# Patient Record
Sex: MALE | Race: WHITE | Employment: STUDENT | URBAN - METROPOLITAN AREA
[De-identification: names, ages, dates, MRNs, and addresses within clinical notes are randomized per-mention and may not be internally consistent; named-entity substitution may affect disease eponyms.]

---

## 2021-09-15 PROBLEM — S62.021A CLOSED DISPLACED FRACTURE OF MIDDLE THIRD OF NAVICULAR BONE OF RIGHT WRIST: Status: ACTIVE | Noted: 2021-09-15

## 2021-09-15 NOTE — H&P (VIEW-ONLY)
[facility] Note    Name: Prince Diaz  Age: 24 y.o. YOB: 2000  Gender: male  MRN: [id number]      Follow up visit:   1. Closed displaced fracture of middle third of scaphoid bone of right wrist, initial encounter        HPI: Prince Diaz is a 24 y.o. male who is following up for right wrist pain, on the last visit we obtained an MRI to rule out a scaphoid fracture, he is here to review the results. ROS/Meds/PSH/PMH/FH/SH: I personally reviewed the patients standard intake form. Pertinents are discussed in the HPI    Physical Examination:  General: Awake and alert. HEENT: Normocephalic, atraumatic  CV/Pulm: Breathing even and unlabored  Skin: No obvious rashes noted. Lymphatic: No obvious evidence of lymphedema or lymphadenopathy    Musculoskeletal Examination:  Examination on the Right upper extremity demonstrates cap refill < 5 seconds in all fingers, tenderness palpation of the anatomic snuffbox as well as the scaphoid tubercle and radiocarpal joint, limited wrist motion due to pain, full finger motion. Imaging / Electrodiagnostic Tests:     MRI was reviewed and independently interpreted which demonstrates a mid waist to slightly more proximal minimally displaced fracture of the scaphoid waist with significant bony contusion of the scaphoid, lunate and triquetrum    Assessment:   1. Closed displaced fracture of middle third of scaphoid bone of right wrist, initial encounter        Plan:   We discussed the diagnosis and different treatment options. We discussed observation, therapy, antiinflammatory medications and other pertinent treatment modalities. After discussing in detail the patient elects to proceed with right scaphoid open reduction internal fixation.   We discussed all treatment options including casting for 2 months with a very good chance of healing of upwards of 95%, this comes at the expense of some loss of wrist motion due to being in a cast for 2 months and if the fracture does not heal then the outcome is much less favorable. We discussed the treatment with open reduction internal fixation, if the fracture remains undisplaced during surgery then I would only put him in a splint for 2 weeks and then transition him to a wrist brace so he can start motion at 2 weeks, if the fracture displaces at all then I would have a very low threshold to place him in a cast for 6 weeks and then start motion    Patient voiced accordance and understanding of the information provided and the formulated plan. All questions were answered to the patient's satisfaction during the encounter. 5 This is an illness/condition that threatens bodily function  Treatment at this time: Elective major surgery with procedural risk factors     Patient understands risks and benefits of RIGHT SCAPHOID OPEN REDUCTION INTERNAL FIXATION including but not limited to nerve injury, vessel injury, infection, failure to achieve desired results and possible need for additional surgery. Patient understands and wishes to proceed with surgery.      On Exam:   The patient is alert and oriented; ;   Lung auscultation is clear bilaterally   Heart has RRR without murmurs      Tanesha Barber MD  Orthopaedic Surgery  09/15/21  4:19 PM

## 2021-09-19 ENCOUNTER — ANESTHESIA EVENT (OUTPATIENT)
Dept: SURGERY | Age: 21
End: 2021-09-19
Payer: COMMERCIAL

## 2021-09-20 ENCOUNTER — HOSPITAL ENCOUNTER (OUTPATIENT)
Age: 21
Setting detail: OUTPATIENT SURGERY
Discharge: HOME | End: 2021-09-20
Attending: ORTHOPAEDIC SURGERY | Admitting: ORTHOPAEDIC SURGERY
Payer: COMMERCIAL

## 2021-09-20 ENCOUNTER — ANESTHESIA (OUTPATIENT)
Dept: SURGERY | Age: 21
End: 2021-09-20
Payer: COMMERCIAL

## 2021-09-20 ENCOUNTER — APPOINTMENT (OUTPATIENT)
Dept: GENERAL RADIOLOGY | Age: 21
End: 2021-09-20
Attending: ORTHOPAEDIC SURGERY
Payer: COMMERCIAL

## 2021-09-20 VITALS
BODY MASS INDEX: 24.55 KG/M2 | DIASTOLIC BLOOD PRESSURE: 80 MMHG | HEART RATE: 57 BPM | WEIGHT: 156.4 LBS | HEIGHT: 67 IN | RESPIRATION RATE: 16 BRPM | SYSTOLIC BLOOD PRESSURE: 116 MMHG | OXYGEN SATURATION: 99 % | TEMPERATURE: 98 F

## 2021-09-20 PROCEDURE — 77030027129 HC BIT DRL SYNT -E: Performed by: ORTHOPAEDIC SURGERY

## 2021-09-20 PROCEDURE — 77030000032 HC CUF TRNQT ZIMM -B: Performed by: ORTHOPAEDIC SURGERY

## 2021-09-20 PROCEDURE — 76010000160 HC OR TIME 0.5 TO 1 HR INTENSV-TIER 1: Performed by: ORTHOPAEDIC SURGERY

## 2021-09-20 PROCEDURE — 76210000006 HC OR PH I REC 0.5 TO 1 HR: Performed by: ORTHOPAEDIC SURGERY

## 2021-09-20 PROCEDURE — 2709999900 HC NON-CHARGEABLE SUPPLY: Performed by: ORTHOPAEDIC SURGERY

## 2021-09-20 PROCEDURE — 74011250636 HC RX REV CODE- 250/636: Performed by: NURSE PRACTITIONER

## 2021-09-20 PROCEDURE — 74011250636 HC RX REV CODE- 250/636

## 2021-09-20 PROCEDURE — 77030031139 HC SUT VCRL2 J&J -A: Performed by: ORTHOPAEDIC SURGERY

## 2021-09-20 PROCEDURE — 76010010054 HC POST OP PAIN BLOCK: Performed by: ORTHOPAEDIC SURGERY

## 2021-09-20 PROCEDURE — C1713 ANCHOR/SCREW BN/BN,TIS/BN: HCPCS | Performed by: ORTHOPAEDIC SURGERY

## 2021-09-20 PROCEDURE — 77030018836 HC SOL IRR NACL ICUM -A: Performed by: ORTHOPAEDIC SURGERY

## 2021-09-20 PROCEDURE — 77030002986 HC SUT PROL J&J -A: Performed by: ORTHOPAEDIC SURGERY

## 2021-09-20 PROCEDURE — 77030003602 HC NDL NRV BLK BBMI -B: Performed by: ANESTHESIOLOGY

## 2021-09-20 PROCEDURE — 77030033138 HC SUT PGA STRATFX J&J -B: Performed by: ORTHOPAEDIC SURGERY

## 2021-09-20 PROCEDURE — C1769 GUIDE WIRE: HCPCS | Performed by: ORTHOPAEDIC SURGERY

## 2021-09-20 PROCEDURE — 76060000033 HC ANESTHESIA 1 TO 1.5 HR: Performed by: ORTHOPAEDIC SURGERY

## 2021-09-20 PROCEDURE — 76942 ECHO GUIDE FOR BIOPSY: CPT | Performed by: ORTHOPAEDIC SURGERY

## 2021-09-20 PROCEDURE — 74011250636 HC RX REV CODE- 250/636: Performed by: ANESTHESIOLOGY

## 2021-09-20 PROCEDURE — 25628 OPTX CARPL SCPHD FX INT FIXJ: CPT | Performed by: ORTHOPAEDIC SURGERY

## 2021-09-20 PROCEDURE — A4565 SLINGS: HCPCS | Performed by: ORTHOPAEDIC SURGERY

## 2021-09-20 PROCEDURE — 74011000250 HC RX REV CODE- 250

## 2021-09-20 PROCEDURE — 76210000020 HC REC RM PH II FIRST 0.5 HR: Performed by: ORTHOPAEDIC SURGERY

## 2021-09-20 PROCEDURE — 74011000250 HC RX REV CODE- 250: Performed by: ANESTHESIOLOGY

## 2021-09-20 DEVICE — IMPLANTABLE DEVICE: Type: IMPLANTABLE DEVICE | Site: HAND | Status: FUNCTIONAL

## 2021-09-20 RX ORDER — SODIUM CHLORIDE 0.9 % (FLUSH) 0.9 %
5-40 SYRINGE (ML) INJECTION AS NEEDED
Status: DISCONTINUED | OUTPATIENT
Start: 2021-09-20 | End: 2021-09-20 | Stop reason: HOSPADM

## 2021-09-20 RX ORDER — MIDAZOLAM HYDROCHLORIDE 1 MG/ML
2 INJECTION, SOLUTION INTRAMUSCULAR; INTRAVENOUS ONCE
Status: COMPLETED | OUTPATIENT
Start: 2021-09-20 | End: 2021-09-20

## 2021-09-20 RX ORDER — PROPOFOL 10 MG/ML
INJECTION, EMULSION INTRAVENOUS AS NEEDED
Status: DISCONTINUED | OUTPATIENT
Start: 2021-09-20 | End: 2021-09-20 | Stop reason: HOSPADM

## 2021-09-20 RX ORDER — DIPHENHYDRAMINE HYDROCHLORIDE 50 MG/ML
12.5 INJECTION, SOLUTION INTRAMUSCULAR; INTRAVENOUS
Status: DISCONTINUED | OUTPATIENT
Start: 2021-09-20 | End: 2021-09-20 | Stop reason: HOSPADM

## 2021-09-20 RX ORDER — HYDROMORPHONE HYDROCHLORIDE 2 MG/ML
0.5 INJECTION, SOLUTION INTRAMUSCULAR; INTRAVENOUS; SUBCUTANEOUS
Status: DISCONTINUED | OUTPATIENT
Start: 2021-09-20 | End: 2021-09-20 | Stop reason: HOSPADM

## 2021-09-20 RX ORDER — SODIUM CHLORIDE, SODIUM LACTATE, POTASSIUM CHLORIDE, CALCIUM CHLORIDE 600; 310; 30; 20 MG/100ML; MG/100ML; MG/100ML; MG/100ML
75 INJECTION, SOLUTION INTRAVENOUS CONTINUOUS
Status: DISCONTINUED | OUTPATIENT
Start: 2021-09-20 | End: 2021-09-20 | Stop reason: HOSPADM

## 2021-09-20 RX ORDER — LIDOCAINE HYDROCHLORIDE AND EPINEPHRINE 20; 5 MG/ML; UG/ML
INJECTION, SOLUTION EPIDURAL; INFILTRATION; INTRACAUDAL; PERINEURAL AS NEEDED
Status: DISCONTINUED | OUTPATIENT
Start: 2021-09-20 | End: 2021-09-20 | Stop reason: HOSPADM

## 2021-09-20 RX ORDER — SODIUM CHLORIDE, SODIUM LACTATE, POTASSIUM CHLORIDE, CALCIUM CHLORIDE 600; 310; 30; 20 MG/100ML; MG/100ML; MG/100ML; MG/100ML
100 INJECTION, SOLUTION INTRAVENOUS CONTINUOUS
Status: DISCONTINUED | OUTPATIENT
Start: 2021-09-20 | End: 2021-09-20 | Stop reason: HOSPADM

## 2021-09-20 RX ORDER — NALOXONE HYDROCHLORIDE 0.4 MG/ML
0.1 INJECTION, SOLUTION INTRAMUSCULAR; INTRAVENOUS; SUBCUTANEOUS AS NEEDED
Status: DISCONTINUED | OUTPATIENT
Start: 2021-09-20 | End: 2021-09-20 | Stop reason: HOSPADM

## 2021-09-20 RX ORDER — DEXAMETHASONE SODIUM PHOSPHATE 4 MG/ML
INJECTION, SOLUTION INTRA-ARTICULAR; INTRALESIONAL; INTRAMUSCULAR; INTRAVENOUS; SOFT TISSUE
Status: COMPLETED | OUTPATIENT
Start: 2021-09-20 | End: 2021-09-20

## 2021-09-20 RX ORDER — FLUMAZENIL 0.1 MG/ML
0.2 INJECTION INTRAVENOUS
Status: DISCONTINUED | OUTPATIENT
Start: 2021-09-20 | End: 2021-09-20 | Stop reason: HOSPADM

## 2021-09-20 RX ORDER — CEFAZOLIN SODIUM/WATER 2 G/20 ML
2 SYRINGE (ML) INTRAVENOUS ONCE
Status: COMPLETED | OUTPATIENT
Start: 2021-09-20 | End: 2021-09-20

## 2021-09-20 RX ORDER — ROPIVACAINE HYDROCHLORIDE 5 MG/ML
INJECTION, SOLUTION EPIDURAL; INFILTRATION; PERINEURAL
Status: COMPLETED | OUTPATIENT
Start: 2021-09-20 | End: 2021-09-20

## 2021-09-20 RX ORDER — ACETAMINOPHEN 500 MG
1000 TABLET ORAL ONCE
Status: DISCONTINUED | OUTPATIENT
Start: 2021-09-20 | End: 2021-09-20 | Stop reason: HOSPADM

## 2021-09-20 RX ORDER — LIDOCAINE HYDROCHLORIDE 10 MG/ML
0.1 INJECTION INFILTRATION; PERINEURAL AS NEEDED
Status: DISCONTINUED | OUTPATIENT
Start: 2021-09-20 | End: 2021-09-20 | Stop reason: HOSPADM

## 2021-09-20 RX ORDER — OXYCODONE HYDROCHLORIDE 5 MG/1
5 TABLET ORAL
Status: DISCONTINUED | OUTPATIENT
Start: 2021-09-20 | End: 2021-09-20 | Stop reason: HOSPADM

## 2021-09-20 RX ORDER — LIDOCAINE HYDROCHLORIDE 20 MG/ML
INJECTION, SOLUTION EPIDURAL; INFILTRATION; INTRACAUDAL; PERINEURAL AS NEEDED
Status: DISCONTINUED | OUTPATIENT
Start: 2021-09-20 | End: 2021-09-20 | Stop reason: HOSPADM

## 2021-09-20 RX ORDER — FENTANYL CITRATE 50 UG/ML
100 INJECTION, SOLUTION INTRAMUSCULAR; INTRAVENOUS AS NEEDED
Status: COMPLETED | OUTPATIENT
Start: 2021-09-20 | End: 2021-09-20

## 2021-09-20 RX ORDER — SODIUM CHLORIDE 0.9 % (FLUSH) 0.9 %
5-40 SYRINGE (ML) INJECTION EVERY 8 HOURS
Status: DISCONTINUED | OUTPATIENT
Start: 2021-09-20 | End: 2021-09-20 | Stop reason: HOSPADM

## 2021-09-20 RX ADMIN — MIDAZOLAM 2 MG: 1 INJECTION INTRAMUSCULAR; INTRAVENOUS at 10:40

## 2021-09-20 RX ADMIN — ROPIVACAINE HYDROCHLORIDE 20 ML: 5 INJECTION, SOLUTION EPIDURAL; INFILTRATION; PERINEURAL at 10:40

## 2021-09-20 RX ADMIN — SODIUM CHLORIDE, SODIUM LACTATE, POTASSIUM CHLORIDE, AND CALCIUM CHLORIDE 100 ML/HR: 600; 310; 30; 20 INJECTION, SOLUTION INTRAVENOUS at 10:42

## 2021-09-20 RX ADMIN — PROPOFOL 50 MG: 10 INJECTION, EMULSION INTRAVENOUS at 11:18

## 2021-09-20 RX ADMIN — FENTANYL CITRATE 100 MCG: 50 INJECTION INTRAMUSCULAR; INTRAVENOUS at 10:40

## 2021-09-20 RX ADMIN — CEFAZOLIN 2 G: 1 INJECTION, POWDER, FOR SOLUTION INTRAVENOUS at 11:23

## 2021-09-20 RX ADMIN — LIDOCAINE HYDROCHLORIDE 40 MG: 20 INJECTION, SOLUTION EPIDURAL; INFILTRATION; INTRACAUDAL; PERINEURAL at 11:17

## 2021-09-20 RX ADMIN — PROPOFOL 75 MCG/KG/MIN: 10 INJECTION, EMULSION INTRAVENOUS at 11:19

## 2021-09-20 RX ADMIN — LIDOCAINE HYDROCHLORIDE,EPINEPHRINE BITARTRATE 10 ML: 20; .005 INJECTION, SOLUTION EPIDURAL; INFILTRATION; INTRACAUDAL; PERINEURAL at 10:40

## 2021-09-20 RX ADMIN — DEXAMETHASONE SODIUM PHOSPHATE 4 MG: 4 INJECTION, SOLUTION INTRAMUSCULAR; INTRAVENOUS at 10:40

## 2021-09-20 NOTE — ANESTHESIA PREPROCEDURE EVALUATION
Relevant Problems   No relevant active problems       Anesthetic History   No history of anesthetic complications            Review of Systems / Medical History  Patient summary reviewed and pertinent labs reviewed    Pulmonary  Within defined limits                 Neuro/Psych   Within defined limits           Cardiovascular                  Exercise tolerance: >4 METS     GI/Hepatic/Renal  Within defined limits              Endo/Other  Within defined limits           Other Findings              Physical Exam    Airway  Mallampati: I  TM Distance: 4 - 6 cm  Neck ROM: normal range of motion   Mouth opening: Normal     Cardiovascular  Regular rate and rhythm,  S1 and S2 normal,  no murmur, click, rub, or gallop  Rhythm: regular  Rate: normal         Dental  No notable dental hx       Pulmonary  Breath sounds clear to auscultation               Abdominal  GI exam deferred       Other Findings            Anesthetic Plan    ASA: 1  Anesthesia type: total IV anesthesia      Post-op pain plan if not by surgeon: peripheral nerve block single    Induction: Intravenous  Anesthetic plan and risks discussed with: Patient, Mother and Father

## 2021-09-20 NOTE — ANESTHESIA PROCEDURE NOTES
Peripheral Block    Start time: 9/20/2021 10:40 AM  End time: 9/20/2021 10:43 AM  Performed by: Carter Watson MD  Authorized by: Carter Watson MD       Pre-procedure:    Indications: at surgeon's request and post-op pain management    Preanesthetic Checklist: patient identified, risks and benefits discussed, site marked, timeout performed, anesthesia consent given and patient being monitored    Timeout Time: 10:40 EDT          Block Type:   Block Type:  Supraclavicular  Laterality:  Right  Monitoring:  Standard ASA monitoring, continuous pulse ox, heart rate, responsive to questions, oxygen and frequent vital sign checks  Injection Technique:  Single shot  Procedures: ultrasound guided    Patient Position: seated  Prep: chlorhexidine    Needle Type:  Stimuplex  Needle Gauge:  20 G  Needle Localization:  Ultrasound guidance  Medication Injected:  Ropivacaine (PF) (NAROPIN)(0.5%) 5 mg/mL injection, 20 mL  dexamethasone (DECADRON) 4 mg/mL injection, 4 mg  Med Admin Time: 9/20/2021 10:40 AM    Assessment:  Number of attempts:  1  Injection Assessment:  Incremental injection every 5 mL, local visualized surrounding nerve on ultrasound, negative aspiration for blood, negative aspiration for CSF, no paresthesia, no intravascular symptoms and ultrasound image on chart  Patient tolerance:  Patient tolerated the procedure well with no immediate complications

## 2021-09-20 NOTE — OP NOTES
ORTHOPAEDIC SURGICAL NOTE        Juanjo Mosher male 24 y.o.  [id number]   9/20/2021     PRE-OP DIAGNOSIS: Closed displaced fracture of middle third of scaphoid bone of right wrist, initial encounter [S62.021A]   POST-OP DIAGNOSIS: Closed displaced fracture of middle third of scaphoid bone of right wrist, initial encounter [S62.021A]   LATERALITY: Right     PROCEDURES PERFORMED:   Right scaphoid ORIF       SURGEON:   Alexander Olmstead MD, PhD     IMPLANTS:   Implant Name Type Inv. Item Serial No.  Lot No. LRB No. Used Action   SCR 3.0MM AMY HDLS TI [id number] --  - [id number]  SCR 3.0MM AMY HDLS TI [id number] --   SY[employer]TH[employer] [place] [id number] Right 1 Implanted      Procedure(s):  RIGHT SCAPHOID BONE  OPEN REDUCTION INTERNAL FIXATION   Surgeon(s):  Deepak Brown MD   Procedure(s):  RIGHT SCAPHOID BONE  OPEN REDUCTION INTERNAL FIXATION     ANESTHESIA: Regional     STAFF:    Circ-1: Jana Foss RN  Radiology Technician: Flory Gage, RT, R, CT  Scrub Tech-1: Solange Long     ESTIMATED BLOOD LOSS: Minimal       TOTAL IV FLUIDS : See anesthesia note    COMPLICATIONS: None     DRAINS:       TOURNIQUET TIME:   Total Tourniquet Time Documented:  Upper Arm (Right) - 31 minutes  Total: Upper Arm (Right) - 31 minutes       INDICATION FOR PROCEDURE:     Juanjo Mosher sustained a right scaphoid fracture during a tennis match. Surgical and non-surgical treatment options were discussed with the patient and their family, as well as the risk and benefits of each option. After thorough discussion, the patient decided to proceed with surgical management. Specific to this treatment plan, we discussed in detail surgical risks including scar, pain, bleeding, infection, anesthetic risks, neurovascular injury, failure to achieve desired results, hardware problems, need for further surgery,  weakness, stiffness, risk of death and potential risk of other unforseen complication.        The patient consented to the procedure after discussion of the risks and benefits. DESCRIPTION OF PROCEDURE:     The patient was identified in the holding room. The right wrist was marked and confirmed as the correct operative site. They were then brought to the OR and transferred onto the OR table in the supine position. All bony prominences were well padded. SCDs were placed on the bilateral legs throughout the case. A timeout was performed, verifying the correct patient, the correct side  and the correct procedure. Antibiotics were then administered, and were redosed during the procedure as needed at indicated intervals. A non-sterile tourniquet was placed on the arm. The upper extremity was pre scrubbed and then prepped and draped in routine sterile fashion. An incisional timeout was performed re-confirming the correct patient, surgical site and procedure, as well as verifying antibiotics. A dorsal approach was made, incision was 4cm in length over Saima tubercle. Blunt dissection wad done to preserve the sensory nerve, the Epl was partially released from the retinaculum, the 2nd and 4th compartment tendons were retracted and a 1cm longitudinal capsulotomy was made. A guidewire was advanced into the scaphoid and proper placement was confirmed under fluoroscopy. A 18mm headless compression screw was then advanced after drilling over the guidewire. Proper reduction and screw placement was confirmed under fluoroscopy.     The tourniquet was deflated and hemostasis was ensured. The wounds were then thoroughly irrigated and closed in layered fashion with 4-0 Vicryl and 4-0 prolene. A sterile dressing was applied followed by a  splint      The patient was awakened and taken to PACU in stable condition. All sponge and needle counts were correct at the end of the case. I was present and scrubbed for the entire procedure. DISPOSITION:    The patient will be discharged home.      Weightbearing status: NWB       CHEMICAL VTE (DVT) PROPHYLAXIS: None warranted for this case     FOLLOW-UP:  10-14 days for wound check and XRs if needed.      Yessica Hartman MD    09/20/21  12:04 PM

## 2021-09-20 NOTE — ANESTHESIA POSTPROCEDURE EVALUATION
Procedure(s):  RIGHT SCAPHOID BONE  OPEN REDUCTION INTERNAL FIXATION. total IV anesthesia    Anesthesia Post Evaluation      Multimodal analgesia: multimodal analgesia used between 6 hours prior to anesthesia start to PACU discharge  Patient location during evaluation: PACU  Patient participation: complete - patient participated  Level of consciousness: awake and alert  Pain management: adequate  Airway patency: patent  Anesthetic complications: no  Cardiovascular status: acceptable  Respiratory status: acceptable  Hydration status: acceptable  Post anesthesia nausea and vomiting:  controlled  Final Post Anesthesia Temperature Assessment:  Normothermia (36.0-37.5 degrees C)      INITIAL Post-op Vital signs:   Vitals Value Taken Time   /67 09/20/21 1234   Temp 36.3 °C (97.3 °F) 09/20/21 1211   Pulse 55 09/20/21 1237   Resp 16 09/20/21 1234   SpO2 99 % 09/20/21 1237   Vitals shown include unvalidated device data.

## 2021-09-20 NOTE — INTERVAL H&P NOTE
H&P Update:  Addis Liu was seen and examined. History and physical has been reviewed. The patient has been examined.  There have been no significant clinical changes since the completion of the originally dated History and Physical.    Brien Meyers MD  Orthopaedic Surgery  09/20/21  10:23 AM

## 2021-09-20 NOTE — DISCHARGE INSTRUCTIONS
Navicular (Scaphoid) Fracture of the Wrist in Children: Care Instructions  Your Care Instructions  A navicular fracture (also called a scaphoid fracture) is a break in a small bone on the thumb side of your child's wrist. It can cause pain and swelling in the wrist and make it hard for your child to move his or her wrist.  Your child may have broken this bone by putting his or her hand out to break a fall. Treatment for this type of break includes wearing an arm cast or splint and sometimes having surgery. The type of treatment depends on how bad the break is. Even if the first X-rays don't show a break, there may be one. If your doctor thinks a break is possible, he or she will treat it. It is better to do this than risk not treating a break and possibly delay healing. If the doctor treats the break, he or she may ask your child to come back in 1 to 2 weeks for another X-ray. It is important to follow the doctor's instructions because parts of the navicular bone do not have a good blood supply. This can make healing slow and difficult. Healthy habits can help your child heal. Give your child a variety of healthy foods. And don't smoke around him or her. The doctor has checked your child carefully, but problems can develop later. If you notice any problems or new symptoms, get medical treatment right away. Follow-up care is a key part of your child's treatment and safety. Be sure to make and go to all appointments, and call your doctor if your child is having problems. It's also a good idea to know your child's test results and keep a list of the medicines your child takes. How can you care for your child at home? · Be safe with medicines. Read and follow all instructions on the label. ? If the doctor gave your child a prescription medicine for pain, give it as prescribed.   ? If your child is not taking a prescription pain medicine, ask your doctor if your child can take an over-the-counter medicine. · Put ice or a cold pack on your child's wrist for 10 to 20 minutes at a time. Try to do this every 1 to 2 hours for the next 3 days (when your child is awake) or until the swelling goes down. Put a thin cloth between the ice and your child's skin. · Prop up your child's wrist on pillows when he or she sits or lies down in the first few days after the injury. Keep the wrist higher than the level of the heart. This will help reduce swelling. · Follow your doctor's directions for wearing a splint or cast.  When should you call for help? Call your doctor now or seek immediate medical care if:    · Your child has problems with the cast or splint. For example:  ? The skin under the cast or splint is burning or stinging. ? The cast or splint feels too tight. ? There is a lot of swelling near the cast or splint. (Some swelling is normal.)  ? Your child has a new fever. ? There is drainage or a bad smell coming from the cast or splint.     · Your child has severe or increasing pain.     · Your child's fingers turn cold or change color.     · Your child has tingling, weakness, or numbness in the hand and fingers. Watch closely for changes in your child's health, and be sure to contact your doctor if:    · Your child does not get better as expected. Where can you learn more? Go to http://www.gray.com/  Enter G162 in the search box to learn more about \"Navicular (Scaphoid) Fracture of the Wrist in Children: Care Instructions. \"  Current as of: July 1, 2021               Content Version: 13.0  © 2048-2724 [employer]. Care instructions adapted under license by [employer] (which disclaims liability or warranty for this information). If you have questions about a medical condition or this instruction, always ask your healthcare professional. [place] [address] any warranty or liability for your use of this information.        Postoperative Instructions:      Weightbearing or Lifting: You  are  not  allowed  to  lift  any  weight  or  bear  any  weight  on  the  surgical  extremity  until  cleared  by  your  surgeon. Dressing  instructions:    Keep  your  dressing  and/or  splint  clean  and  dry  at  all  times. It  will  be  removed  at  your  first  post-operative  appointment or therapy apppointment. Your  stitches  will  be  removed  at  this  visit. Showering  Instructions:  May  shower  But keep surgical dressing clean and dry until removed as explained above. Pain  Control:  - You  have  been  given  a  prescription  to  be  taken  as  directed  for  post-operative  pain  control. In  addition,  elevate  the  operative  extremity  above  the  heart  at  all  times  to  prevent  swelling  and  throbbing  pain. - If you develop constipation while taking narcotic pain medications (Norco, Hydrocodone, Percocet, Oxycodone, Dilaudid, Hydromorphone) take  over-the-counter  Colace,  100mg  by  mouth  twice  a  Day. - Nausea  is  a  common  side  effect  of  many  pain  medications. You  will  want  to  eat something  before  taking  your  pain  medicine  to  help  prevent  Nausea. - If  you  are  taking  a  prescription  pain  medication  that  contains  acetaminophen,  we  recommend  that  you  do  not  take  additional  over  the  counter  acetaminophen  (Tylenol®). Other  pain  relieving  options:   - Using  a  cold  pack  to  ice  the  affected  area  a  few  times  a  day  (15  to  20  minutes  at  a  time)  can  help  to  relieve  pain,  reduce  swelling  and  bruising.      - Elevation  of  the  affected  area  can  also  help  to  reduce  pain  and  swelling. Did  you  receive  a  nerve  Block? A  nerve  block  can  provide  pain  relief  for  one  hour  to  two  days  after  your  surgery.   As  long  as  the  nerve  block  is  working,  you  will  experience  little  or  no  sensation  in  the  area the  surgeon  operated  on. As  the  nerve  block  wears  off,  you  will  begin  to  experience  pain  or  discomfort. It  is  very  important  that  you  begin  taking  your  prescribed  pain  medication  before  the  nerve  block  fully  wears  off. The first sign that the nerve block is wearing off is tingling in your fingers. Treating  your  pain  at  the  first  sign  of  the  block  wearing  off  will  ensure  your  pain  is  better  controlled  and  more  tolerable  when  full-sensation  returns. Do  not  wait  until  the  pain  is  intolerable,  as  the  medicine  will  be  less  effective. It  is  better  to  treat  pain  in  advance  than  to  try  and  catch  up. General  Anesthesia or Sedation:      If  you  did  not  receive  a  nerve  block  during  your  surgery,  you  will  need  to  start  taking  your  pain  medication  shortly  after  your  surgery  and  should  continue  to  do  so  as  prescribed  by  your  surgeon. Please  call  [phone]  with any concern and ask to speak with Tamiko Quijano. Concerning problems include:      -  Excessive  redness  of  the  incisions      -  Drainage  for  more  than  2  Days after surgery or any foul smelling drainage  -  Fever  of  more  than  101.5  F      Please  call  [phone]  if  you  do  not  receive  or  are  unsure  of  your  first  follow-up  appointment. You  should  see  the  doctor  10-14  days  after  your  Surgery. Thank you for choosing me and [address] [place] [address] for your care. I will go above and beyond to ensure you receive the best care possible. Virginia Burns MD, PhD    MEDICATION INTERACTION:  During your procedure you potentially received a medication or medications which may reduce the effectiveness of oral contraceptives. Please consider other forms of contraception for 1 month following your procedure if you are currently using oral contraceptives as your primary form of birth control. In addition to this, we recommend continuing your oral contraceptive as prescribed, unless otherwise instructed by your physician, during this time    After general anesthesia or intravenous sedation, for 24 hours or while taking prescription Narcotics:  · Limit your activities  · A responsible adult needs to be with you for the next 24 hours  · Do not drive and operate hazardous machinery  · Do not make important personal or business decisions  · Do not drink alcoholic beverages  · If you have not urinated within 8 hours after discharge, and you are experiencing discomfort from urinary retention, please go to the nearest ED. · If you have sleep apnea and have a CPAP machine, please use it for all naps and sleeping. · Please use caution when taking narcotics and any of your home medications that may cause drowsiness. *  Please give a list of your current medications to your Primary Care Provider. *  Please update this list whenever your medications are discontinued, doses are      changed, or new medications (including over-the-counter products) are added. *  Please carry medication information at all times in case of emergency situations. These are general instructions for a healthy lifestyle:  No smoking/ No tobacco products/ Avoid exposure to second hand smoke  Surgeon General's Warning:  Quitting smoking now greatly reduces serious risk to your health. Obesity, smoking, and sedentary lifestyle greatly increases your risk for illness  A healthy diet, regular physical exercise & weight monitoring are important for maintaining a healthy lifestyle    You may be retaining fluid if you have a history of heart failure or if you experience any of the following symptoms:  Weight gain of 3 pounds or more overnight or 5 pounds in a week, increased swelling in our hands or feet or shortness of breath while lying flat in bed.   Please call your doctor as soon as you notice any of these symptoms; do not wait until your next office visit.

## 2021-09-20 NOTE — ADDENDUM NOTE
Addendum  created 09/20/21 1418 by Filemon Arzate CRNA    Flowsheet accepted, Intraprocedure Flowsheets edited

## 2021-09-21 NOTE — PROGRESS NOTES
Spiritual Care visit. Initial Visit, Pre Surgery Consult. Visit and prayer with patient's parents after patient went to surgery.     Visit by Nathaniel Root M.Ed., Th.B. ,Staff

## 2022-02-18 ENCOUNTER — HOSPITAL ENCOUNTER (OUTPATIENT)
Dept: PHYSICAL THERAPY | Age: 22
Discharge: HOME | End: 2022-02-18
Payer: COMMERCIAL

## 2022-02-18 PROCEDURE — 97161 PT EVAL LOW COMPLEX 20 MIN: CPT

## 2022-02-18 PROCEDURE — 97110 THERAPEUTIC EXERCISES: CPT

## 2022-02-18 NOTE — THERAPY EVALUATION
Sulema Reich  : 2000 [address] [facility]  [address] [facility] [address]  [phone]   [phone]        OUTPATIENT PHYSICAL THERAPY:Initial Assessment 2022         ICD-10: Treatment Diagnosis: Pain in right arm (M79.601) and Pain in right shoulder (M25.511)  Precautions/Allergies:   Cat dander   Fall Risk Score:     Ambulatory/Rehab Services H2 Model Falls Risk Assessment    Risk Factors:       (1)  Gender [Male] Ability to Rise from Chair:       (0)  Ability to rise in a single movement    Falls Prevention Plan:       No modifications necessary   Total: (5 or greater = High Risk): 1     [facility] [employer]. All Rights Reserved. [facility] [employer] Patent #[id number]. Federal Law prohibits the replication, distribution or use without written permission from [facility] [employer]     MD Orders: evaluate and treat MEDICAL/REFERRING DIAGNOSIS:  Acute pain of right shoulder [M25.511]   DATE OF ONSET: date of surgery: 2/10/22 s/p bankart repair with hill-sacs lesion  REFERRING PHYSICIAN: Rubio Webb MD  RETURN PHYSICIAN APPOINTMENT:      INITIAL ASSESSMENT:  Mr. Contreras Gonzalez presents to therapy following above surgical repair. He has impaired mobility due to continued healing of the repair, tightness of the musculature of the shoulder complex, and pain. He has decreased strength due to atrophy from immobilization and inhibition from pain. Impairments cause difficulty with all reaching, lifting, and carrying tasks. Skilled therapy is required to return to prior level of function. PROBLEM LIST (Impacting functional limitations):  1. Decreased Strength  2. Decreased ADL/Functional Activities  3. Increased Pain  4. Decreased Activity Tolerance  5. Decreased Flexibility/Joint Mobility INTERVENTIONS PLANNED:  1. Electrical Stimulation  2. Family Education  3. Home Exercise Program (HEP)  4. Manual Therapy  5.  Neuromuscular Re-education/Strengthening  6. Range of Motion (ROM)  7. Therapeutic Activites  8. Therapeutic Exercise/Strengthening  9. modalities    TREATMENT PLAN:  Effective Dates: 2/18/2022 TO 5/22/2022 (90 days). Frequency/Duration: 3 times a week for 90 Days  GOALS: (Goals have been discussed and agreed upon with patient.)  Short-Term Functional Goals: Time Frame: 4 weeks  1. Patient will be independent with HEP. 2. Patient will demonstrate sufficient healing to discontinue use of sling for daily activity. Discharge Goals: Time Frame: 16 weeks  1. Patient will restore mobility of shoulder to symmetric with uninvolved to return to prior level of function. 2. Patient will demonstrate 5/5 strength into ER at 90/90 to normalize dynamic stability required for return to prior level of function. 3. Patient will demonstrate single arm med ball throw with uninvolved to restore power production required for return to prior level of function. Rehabilitation Potential For Stated Goals: Excellent  Regarding Nancy Osorio's therapy, I certify that the treatment plan above will be carried out by a therapist or under their direction. Thank you for this referral,  Alyssa Rubin DPT       Referring Physician Signature: Heladio Yusuf MD              Date                      HISTORY:   History of Present Injury/Illness (Reason for Referral):  Patient presents to therapy following above surgical repair. Initial injury occurred over the fall while playing tennis when he fell onto the outstretched arm. During the fall, he also broke his scaphoid and did not initially realize he injured the shoulder. He began to notice immediate shoulder pain when he returned to overhead serving following recovery from scaphoid fracture. He notes no complications with surgery and presents today with primary complaint of general soreness through the arm. He is no longer having to take pain medication, but occasionally will take aleeve.  He also notes improving quality of sleep. Past Medical History/Comorbidities:   Mr. Hien Correia  has a past medical history of COVID-19 vaccine series completed and Right wrist pain. Mr. Hien Correia  has a past surgical history that includes hx wisdom teeth extraction. Social History/Living Environment:     Patient is a college student and lives on campus. Prior Level of Function/Work/Activity:  Patient plays collegiate tennis  Dominant Side:         RIGHT  Current Medications:    Current Outpatient Medications:     ondansetron (ZOFRAN ODT) 4 mg disintegrating tablet, Take 1 Tablet by mouth every eight (8) hours as needed for Nausea. (Patient not taking: Reported on 9/20/2021), Disp: 28 Tablet, Rfl: 0   Date Last Reviewed:  2/18/2022   # of Personal Factors/Comorbidities that affect the Plan of Care: 0: LOW COMPLEXITY   EXAMINATION:   Observation/Orthostatic Postural Assessment:    Mild winging of inferior angle of right scapula and elevation of right scapula. Palpation:    No specific palpable tenderness. ROM:    All passive:   Flexion: 90 °   ER: 20 ° in scaption  IR: hand to stomach  Elbow, wrist and hand mobility are WNL  Strength:    is WNL  Further strength testing is not currently indicated. Special Tests:    Not applicable  Neurological Screen:  Grossly intact  Functional Mobility:   WNL  Balance: WNL   Body Structures Involved:  1. Nerves  2. Bones  3. Joints  4. Muscles  5. Ligaments Body Functions Affected:  1. Sensory/Pain  2. Neuromusculoskeletal  3. Movement Related Activities and Participation Affected:  1. General Tasks and Demands  2. Mobility  3. Self Care  4. Domestic Life  5. Interpersonal Interactions and Relationships  6.  Community, Social and [place]   # of elements that affect the Plan of Care: 1-2: LOW COMPLEXITY   CLINICAL PRESENTATION:   Presentation: Stable and uncomplicated: LOW COMPLEXITY   CLINICAL DECISION MAKING:   Tool Used: Disabilities of the Arm, Shoulder and Hand (DASH) Questionnaire - Quick Version  Score:  Initial: will fill out next visit/55  Most Recent: X/55 (Date: -- )   Interpretation of Score: The DASH is designed to measure the activities of daily living in person's with upper extremity dysfunction or pain. Each section is scored on a 1-5 scale, 5 representing the greatest disability. The scores of each section are added together for a total score of 55. Medical Necessity:   · Patient is expected to demonstrate progress in strength, range of motion, balance and coordination  ·  to increase independence with activities that involve use of the right arm  · .  Reason for Services/Other Comments:  · Patient has demonstrated an improvement in functional level by independent performance of HEP.    · .   Use of outcome tool(s) and clinical judgement create a POC that gives a: Clear prediction of patient's progress: LOW COMPLEXITY     Total Treatment Duration:  PT Patient Time In/Time Out  Time In: 1445  Time Out: [address], DPT

## 2022-02-18 NOTE — PROGRESS NOTES
Tiffany Nageotte  : 2000  Primary: Kanwal Marroquin Of [employer]*  Secondary: [employer] [address] @ [address] [place] [address].  [phone]   [phone]      OUTPATIENT PHYSICAL THERAPY: Daily Treatment Note  2022    ICD-10: Treatment Diagnosis: Pain in right arm (M79.601) and Pain in right shoulder (M25.511)  Effective Dates: 2022 TO 2022 (90 days). Frequency/Duration: 3 times a week for 90 Days  GOALS: (Goals have been discussed and agreed upon with patient.)  Short-Term Functional Goals: Time Frame: 4 weeks  1. Patient will be independent with HEP. 2. Patient will demonstrate sufficient healing to discontinue use of sling for daily activity. Discharge Goals: Time Frame: 16 weeks  1. Patient will restore mobility of shoulder to symmetric with uninvolved to return to prior level of function. 2. Patient will demonstrate 5/5 strength into ER at 90/90 to normalize dynamic stability required for return to prior level of function. 3. Patient will demonstrate single arm med ball throw with uninvolved to restore power production required for return to prior level of function. _________________________________________________________________________  Pre-treatment Symptoms/Complaints:  See initial evaluation  Pain: Initial: mild-moderate/10 Post Session:  No increase/10   Medications Last Reviewed:  2022  Updated Objective Findings:  Below measures from initial evaluation unless otherwise noted. Observation/Orthostatic Postural Assessment:    Mild winging of inferior angle of right scapula and elevation of right scapula. Palpation:    No specific palpable tenderness. ROM:    All passive:   Flexion: 90 °   ER: 20 ° in scaption  IR: hand to stomach  Elbow, wrist and hand mobility are WNL  Strength:    is WNL  Further strength testing is not currently indicated.      TREATMENT:   THERAPEUTIC ACTIVITY: ( see below for minutes): Therapeutic activities per grid below to improve mobility, strength, balance and coordination. Required minimal visual, verbal, manual and tactile cues to improve independence and safety with daily activities . THERAPEUTIC EXERCISE: (see below for minutes):  Exercises per grid below to improve mobility, strength, balance and coordination. Required minimal verbal and manual cues to promote proper body alignment, promote proper body posture and promote proper body mechanics. Progressed resistance, range, repetitions and complexity of movement as indicated. MANUAL THERAPY: (see below for minutes): Joint mobilization and Soft tissue mobilization was utilized and necessary because of the patient's restricted joint motion, painful spasm, loss of articular motion and restricted motion of soft tissue. MODALITIES: (see below for minutes):      to decrease pain    SELF CARE: (see below for minutes): Procedure(s) (per grid) utilized to improve and/or restore self-care/home management as related to dressing, bathing and grooming. Required minimal verbal cueing to facilitate activities of daily living skills and compensatory activities. Date: 2/18/22       Modalities: Ice with IFC 15 min at end of session                       Therapeutic Exercise: 25 min        Stretching To 90 ° of flexion, 20 ° of ER       Isometrics Into ER and IR at doorway [id number] each       Elbow ROM 20x AROM                               Proprioceptive Activities:                                Manual Therapy:                        Therapeutic Activities:                                  HEP: see 2/18/22 flow sheet for specifics.     [employer] Portal  Treatment/Session Summary:    · Response to Treatment:  Patient is independent with performance of above described home program.  · Communication/Consultation:  None today  · Equipment provided today:  None today  · Recommendations/Intent for next treatment session: Next visit will focus on progression of strength and return to prior level of function.     Total Treatment Billable Duration:  45 minutes  PT Patient Time In/Time Out  Time In: 4541  Time Out: [address] [facility], JHOAN    Future Appointments   Date Time Provider Kwasi Hernandez   2/25/2022  3:30 PM JHOAN Duncan [facility]   3/2/2022  8:00 AM JHOAN DuncanR [facility]   3/3/2022 11:45 AM JHOAN DuncanOFR [facility]   3/9/2022  8:00 AM Rakan Bullock DPT [facility]   3/11/2022  3:30 PM JHOAN DuncanSARY [facility]   3/18/2022  3:30 PM JHOAN DuncanR [facility]   3/23/2022  8:00 AM Rakan Bullock DPT [facility]   3/30/2022  8:00 AM Rakan Bullock DPT [facility]   3/31/2022  4:15 PM Rakan Bullock DPT [facility]

## 2022-02-25 ENCOUNTER — HOSPITAL ENCOUNTER (OUTPATIENT)
Dept: PHYSICAL THERAPY | Age: 22
Discharge: HOME | End: 2022-02-25
Payer: COMMERCIAL

## 2022-02-25 PROCEDURE — 97014 ELECTRIC STIMULATION THERAPY: CPT

## 2022-02-25 PROCEDURE — 97110 THERAPEUTIC EXERCISES: CPT

## 2022-02-25 NOTE — PROGRESS NOTES
Antonio Guhtrie  : 2000  Primary: María Lang [employer]*  Secondary: [employer] [address] [facility] @ [address]  [phone]   [phone]      OUTPATIENT PHYSICAL THERAPY: Daily Treatment Note  2022    ICD-10: Treatment Diagnosis: Pain in right arm (M79.601) and Pain in right shoulder (M25.511)  Effective Dates: 2022 TO 2022 (90 days). Frequency/Duration: 3 times a week for 90 Days  GOALS: (Goals have been discussed and agreed upon with patient.)  Short-Term Functional Goals: Time Frame: 4 weeks  1. Patient will be independent with HEP. 2. Patient will demonstrate sufficient healing to discontinue use of sling for daily activity. Discharge Goals: Time Frame: 16 weeks  1. Patient will restore mobility of shoulder to symmetric with uninvolved to return to prior level of function. 2. Patient will demonstrate 5/5 strength into ER at 90/90 to normalize dynamic stability required for return to prior level of function. 3. Patient will demonstrate single arm med ball throw with uninvolved to restore power production required for return to prior level of function. _________________________________________________________________________  Pre-treatment Symptoms/Complaints:  \"it's been a little sore, but it's doing better. \"   Pain: Initial: mild-moderate/10 Post Session:  No increase/10   Medications Last Reviewed:  2022  Updated Objective Findings:  Below measures from initial evaluation unless otherwise noted. Observation/Orthostatic Postural Assessment:    Mild winging of inferior angle of right scapula and elevation of right scapula. Palpation:    No specific palpable tenderness. ROM:    All passive:   Flexion: 90 °   ER: 20 ° in scaption  IR: hand to stomach  Elbow, wrist and hand mobility are WNL  Strength:    is WNL  Further strength testing is not currently indicated. TREATMENT:   THERAPEUTIC ACTIVITY: ( see below for minutes): Therapeutic activities per grid below to improve mobility, strength, balance and coordination. Required minimal visual, verbal, manual and tactile cues to improve independence and safety with daily activities . THERAPEUTIC EXERCISE: (see below for minutes):  Exercises per grid below to improve mobility, strength, balance and coordination. Required minimal verbal and manual cues to promote proper body alignment, promote proper body posture and promote proper body mechanics. Progressed resistance, range, repetitions and complexity of movement as indicated. MANUAL THERAPY: (see below for minutes): Joint mobilization and Soft tissue mobilization was utilized and necessary because of the patient's restricted joint motion, painful spasm, loss of articular motion and restricted motion of soft tissue. MODALITIES: (see below for minutes):      to decrease pain    SELF CARE: (see below for minutes): Procedure(s) (per grid) utilized to improve and/or restore self-care/home management as related to dressing, bathing and grooming. Required minimal verbal cueing to facilitate activities of daily living skills and compensatory activities. Date: 2/18/22 2/25/22 (visit 2)      Modalities:  15 min       Ice with IFC 15 min at end of session 15 min                      Therapeutic Exercise: 25 min  45 min       Stretching To 90 ° of flexion, 20 ° of ER repeat      Isometrics Into ER and IR at doorway [id number] each ER, IR, flexion, extension      Elbow ROM 20x AROM       Scapular mobility  Retraction-protraction 20x, D2 20x                      Proprioceptive Activities:                                Manual Therapy:                        Therapeutic Activities:                                  HEP: see 2/18/22 flow sheet for specifics.     [employer] Portal  Treatment/Session Summary:    · Response to Treatment:  Demonstrates difficulty isolating control of scapular movement. · Communication/Consultation:  None today  · Equipment provided today:  None today  · Recommendations/Intent for next treatment session: Next visit will focus on progression of strength and return to prior level of function.     Total Treatment Billable Duration:  60 minutes  PT Patient Time In/Time Out  Time In: 1530  Time Out: [address], DPT    Future Appointments   Date Time Provider Kwasi Hernandez   3/2/2022  8:00 AM [facility]   3/3/2022 11:45 AM Debora Lea DPT [facility]   3/9/2022  7:00 PM Debora Lea DPT [facility]   3/11/2022  7:00 PM Debora Lea DPT [facility]   3/17/2022  2:45 PM Debora Lea DPT [facility]   3/18/2022  3:30 PM Debora Lea DPT [facility]   3/21/2022 11:45 AM Debora Lea DPT [facility]   3/23/2022  8:00 AM Debora Lea, JHOAN [facility]   3/30/2022  8:00 AM Debora Lea DPT [facility]   3/31/2022  4:15 PM Debora Lea DPT [facility]

## 2022-03-02 ENCOUNTER — HOSPITAL ENCOUNTER (OUTPATIENT)
Dept: PHYSICAL THERAPY | Age: 22
Discharge: HOME | End: 2022-03-02
Payer: COMMERCIAL

## 2022-03-02 PROCEDURE — 97110 THERAPEUTIC EXERCISES: CPT

## 2022-03-02 PROCEDURE — 97014 ELECTRIC STIMULATION THERAPY: CPT

## 2022-03-02 NOTE — PROGRESS NOTES
Dayne Santos  : 2000  Primary: Naya Gloria [facility] [place] [employer]*  Secondary: [employer] [address] @ [place] [address].  [phone]   [phone]      OUTPATIENT PHYSICAL THERAPY: Daily Treatment Note  3/2/2022    ICD-10: Treatment Diagnosis: Pain in right arm (M79.601) and Pain in right shoulder (M25.511)  Effective Dates: 2022 TO 2022 (90 days). Frequency/Duration: 3 times a week for 90 Days  GOALS: (Goals have been discussed and agreed upon with patient.)  Short-Term Functional Goals: Time Frame: 4 weeks  1. Patient will be independent with HEP. 2. Patient will demonstrate sufficient healing to discontinue use of sling for daily activity. Discharge Goals: Time Frame: 16 weeks  1. Patient will restore mobility of shoulder to symmetric with uninvolved to return to prior level of function. 2. Patient will demonstrate 5/5 strength into ER at 90/90 to normalize dynamic stability required for return to prior level of function. 3. Patient will demonstrate single arm med ball throw with uninvolved to restore power production required for return to prior level of function. _________________________________________________________________________  Pre-treatment Symptoms/Complaints:  Notes continued, general soreness. Pain: Initial: mild-moderate/10 Post Session:  No increase/10   Medications Last Reviewed:  3/2/2022  Updated Objective Findings:  Below measures from initial evaluation unless otherwise noted. Observation/Orthostatic Postural Assessment:    Mild winging of inferior angle of right scapula and elevation of right scapula. Palpation:    No specific palpable tenderness. ROM:    All passive:   Flexion: 90 °   ER: 20 ° in scaption  IR: hand to stomach  Elbow, wrist and hand mobility are WNL  Strength:    is WNL  Further strength testing is not currently indicated.      TREATMENT:   THERAPEUTIC ACTIVITY: ( see below for minutes): Therapeutic activities per grid below to improve mobility, strength, balance and coordination. Required minimal visual, verbal, manual and tactile cues to improve independence and safety with daily activities . THERAPEUTIC EXERCISE: (see below for minutes):  Exercises per grid below to improve mobility, strength, balance and coordination. Required minimal verbal and manual cues to promote proper body alignment, promote proper body posture and promote proper body mechanics. Progressed resistance, range, repetitions and complexity of movement as indicated. MANUAL THERAPY: (see below for minutes): Joint mobilization and Soft tissue mobilization was utilized and necessary because of the patient's restricted joint motion, painful spasm, loss of articular motion and restricted motion of soft tissue. MODALITIES: (see below for minutes):      to decrease pain    SELF CARE: (see below for minutes): Procedure(s) (per grid) utilized to improve and/or restore self-care/home management as related to dressing, bathing and grooming. Required minimal verbal cueing to facilitate activities of daily living skills and compensatory activities.      Date: 2/18/22 2/25/22 (visit 2) 3/2/22 (visit 3)     Modalities:  15 min  15 min      Ice with IFC 15 min at end of session 15 min 15 min                      Therapeutic Exercise: 25 min  45 min  45 min      Stretching To 90 ° of flexion, 20 ° of ER repeat Continued to previous limits     Isometrics Into ER and IR at doorway [id number] each ER, IR, flexion, extension      Elbow ROM 20x AROM       Scapular mobility  Retraction-protraction 20x, D2 20x SL D2 scap diagonals 30x     Bench press   Supine with wand 30x cueing scap protraction      Rhythmic stabilization   In supine 90 flexion; in SL ER/IR neutral 3x1 min each                             Proprioceptive Activities:                                Manual Therapy: Therapeutic Activities:                                  HEP: see 2/18/22 flow sheet for specifics. [employer] Portal  Treatment/Session Summary:    · Response to Treatment:  With scap protraction during bench press he has decreased mobility at the right in comparison to the left. End range motion remains mildly provocative. Will continue to gradually progress ROM as allowed by healing time frames. · Communication/Consultation:  None today  · Equipment provided today:  None today  · Recommendations/Intent for next treatment session: Next visit will focus on progression of strength and return to prior level of function.     Total Treatment Billable Duration:  60 minutes  PT Patient Time In/Time Out  Time In: 0800  Time Out: 0900  Jai Ye DPT    Future Appointments   Date Time Provider Kwasi Hernandez   3/3/2022 11:45 AM Kalia Rosangela, DPT [facility]   3/9/2022  7:00 PM [facility]   3/11/2022  7:00 PM Kalia Rosangela, DPT [facility]   3/14/2022  3:30 PM Kalia Rosangela, DPT [facility]   3/17/2022  2:45 PM Kalia Rosangela, DPT [facility]   3/18/2022  3:30 PM Kalia Rosangela, DPT [facility]   3/21/2022 11:45 AM Kalia Rosangela, DPT [facility]   3/23/2022  8:00 AM Kalia Rosangela, DPT [facility]   3/30/2022  8:00 AM Kalia Rosangela, DPT [facility]   3/31/2022  4:15 PM Kalia Rosangela, DPT [facility]

## 2022-03-03 ENCOUNTER — HOSPITAL ENCOUNTER (OUTPATIENT)
Dept: PHYSICAL THERAPY | Age: 22
Discharge: HOME | End: 2022-03-03
Payer: COMMERCIAL

## 2022-03-03 PROCEDURE — 97014 ELECTRIC STIMULATION THERAPY: CPT

## 2022-03-03 PROCEDURE — 97110 THERAPEUTIC EXERCISES: CPT

## 2022-03-03 NOTE — PROGRESS NOTES
Alejandrina Stephen  : 2000  Primary: [facility] Robled[facility] Of Connie Keith*  Secondary: [employer] [address] .  [phone]   [phone]      OUTPATIENT PHYSICAL THERAPY: Daily Treatment Note  3/3/2022    ICD-10: Treatment Diagnosis: Pain in right arm (M79.601) and Pain in right shoulder (M25.511)  Effective Dates: 2022 TO 2022 (90 days). Frequency/Duration: 3 times a week for 90 Days  GOALS: (Goals have been discussed and agreed upon with patient.)  Short-Term Functional Goals: Time Frame: 4 weeks  1. Patient will be independent with HEP. 2. Patient will demonstrate sufficient healing to discontinue use of sling for daily activity. Discharge Goals: Time Frame: 16 weeks  1. Patient will restore mobility of shoulder to symmetric with uninvolved to return to prior level of function. 2. Patient will demonstrate 5/5 strength into ER at 90/90 to normalize dynamic stability required for return to prior level of function. 3. Patient will demonstrate single arm med ball throw with uninvolved to restore power production required for return to prior level of function. _________________________________________________________________________  Pre-treatment Symptoms/Complaints:  \"it's definitely improving. \"   Pain: Initial: mild-moderate/10    DOS 2/10/22 s/p bankart with hill-sachs lesion  F/U 3/29 with MD Post Session:  No increase/10   Medications Last Reviewed:  3/3/2022  Updated Objective Findings:  Below measures from initial evaluation unless otherwise noted. Observation/Orthostatic Postural Assessment:    Mild winging of inferior angle of right scapula and elevation of right scapula. Palpation:    No specific palpable tenderness.    ROM:    All passive:   Flexion: 90 °   ER: 20 ° in scaption  IR: hand to stomach  Elbow, wrist and hand mobility are WNL  Strength:    is WNL  Further strength testing is not currently indicated. TREATMENT:   THERAPEUTIC ACTIVITY: ( see below for minutes): Therapeutic activities per grid below to improve mobility, strength, balance and coordination. Required minimal visual, verbal, manual and tactile cues to improve independence and safety with daily activities . THERAPEUTIC EXERCISE: (see below for minutes):  Exercises per grid below to improve mobility, strength, balance and coordination. Required minimal verbal and manual cues to promote proper body alignment, promote proper body posture and promote proper body mechanics. Progressed resistance, range, repetitions and complexity of movement as indicated. MANUAL THERAPY: (see below for minutes): Joint mobilization and Soft tissue mobilization was utilized and necessary because of the patient's restricted joint motion, painful spasm, loss of articular motion and restricted motion of soft tissue. MODALITIES: (see below for minutes):      to decrease pain    SELF CARE: (see below for minutes): Procedure(s) (per grid) utilized to improve and/or restore self-care/home management as related to dressing, bathing and grooming. Required minimal verbal cueing to facilitate activities of daily living skills and compensatory activities.      Date: 2/18/22 2/25/22 (visit 2) 3/2/22 (visit 3) 3/3/22 (visit 4)    Modalities:  15 min  15 min  15 min     Ice with IFC 15 min at end of session 15 min 15 min  15 min                     Therapeutic Exercise: 25 min  45 min  45 min  45 min     Stretching To 90 ° of flexion, 20 ° of ER repeat Continued to previous limits To 120 ° of flexion, 30 ° of ER     Isometrics Into ER and IR at doorway [id number] each ER, IR, flexion, extension  Reviewed, will continue with HEP    Elbow ROM 20x AROM       Scapular mobility  Retraction-protraction 20x, D2 20x SL D2 scap diagonals 30x     Bench press   Supine with wand 30x cueing scap protraction  Supine with wand cueing scap retraction 30x, supine flexion with wand 30x    Rhythmic stabilization   In supine 90 flexion; in SL ER/IR neutral 3x1 min each Repeat both                            Proprioceptive Activities:                                Manual Therapy:                        Therapeutic Activities:                                  HEP: see 2/18/22 flow sheet for specifics. [employer] Portal  Treatment/Session Summary:    · Response to Treatment:  Scapular mobility is progressing with AAROM drills. Pt will be out of town over the next week due to spring break. Will resume POC on his return. · Communication/Consultation:  None today  · Equipment provided today:  None today  · Recommendations/Intent for next treatment session: Next visit will focus on progression of strength and return to prior level of function.     Total Treatment Billable Duration:  60 minutes  PT Patient Time In/Time Out  Time In: 1200  Time Out: 5483 [facility], DPT    Future Appointments   Date Time Provider Kwasi Hernandez   3/9/2022  7:00 PM Joel , DPT [facility]   3/11/2022  7:00 PM Joel , DPT [facility]   3/14/2022  3:30 PM [place] , DPT [facility]   3/17/2022  2:45 PM Joel , DPT [facility]   3/18/2022  3:30 PM Joel , DPT [facility]   3/21/2022 11:45 AM Joel , DPT [facility]   3/23/2022  8:00 AM [place] , DPT [facility]   3/30/2022  8:00 AM Joel , DPT [facility]   3/31/2022  4:15 PM Joel , DPT [facility]

## 2022-03-14 ENCOUNTER — HOSPITAL ENCOUNTER (OUTPATIENT)
Dept: PHYSICAL THERAPY | Age: 22
Discharge: HOME | End: 2022-03-14
Payer: COMMERCIAL

## 2022-03-14 PROCEDURE — 97110 THERAPEUTIC EXERCISES: CPT

## 2022-03-14 PROCEDURE — 97014 ELECTRIC STIMULATION THERAPY: CPT

## 2022-03-14 NOTE — PROGRESS NOTES
Noemi Crews  : 2000  Primary: [employer] Of Connie Keith*  Secondary: [employer] [address] [facility] @ [address], [address] [place] [address]  [phone]   [phone]      OUTPATIENT PHYSICAL THERAPY: Daily Treatment Note  3/14/2022    ICD-10: Treatment Diagnosis: Pain in right arm (M79.601) and Pain in right shoulder (M25.511)  Effective Dates: 2022 TO 2022 (90 days). Frequency/Duration: 3 times a week for 90 Days  GOALS: (Goals have been discussed and agreed upon with patient.)  Short-Term Functional Goals: Time Frame: 4 weeks  1. Patient will be independent with HEP. 2. Patient will demonstrate sufficient healing to discontinue use of sling for daily activity. Discharge Goals: Time Frame: 16 weeks  1. Patient will restore mobility of shoulder to symmetric with uninvolved to return to prior level of function. 2. Patient will demonstrate 5/5 strength into ER at 90/90 to normalize dynamic stability required for return to prior level of function. 3. Patient will demonstrate single arm med ball throw with uninvolved to restore power production required for return to prior level of function. _________________________________________________________________________  Pre-treatment Symptoms/Complaints:  Notes the shoulder has been feeling much better. Still feels like the arm is heavy and weak, but decreasing soreness and pain. Pain: Initial: mild-moderate/10    DOS 2/10/22 s/p bankart with hill-sachs lesion  F/U 3/29 with MD Post Session:  No increase/10   Medications Last Reviewed:  3/14/2022  Updated Objective Findings:  Below measures from initial evaluation unless otherwise noted. Observation/Orthostatic Postural Assessment:    Mild winging of inferior angle of right scapula and elevation of right scapula. Palpation:    No specific palpable tenderness.    ROM:    All passive:   Flexion: 90 °   ER: 20 ° in scaption  IR: hand to stomach  Elbow, wrist and hand mobility are WNL  Strength:    is WNL  Further strength testing is not currently indicated. TREATMENT:   THERAPEUTIC ACTIVITY: ( see below for minutes): Therapeutic activities per grid below to improve mobility, strength, balance and coordination. Required minimal visual, verbal, manual and tactile cues to improve independence and safety with daily activities . THERAPEUTIC EXERCISE: (see below for minutes):  Exercises per grid below to improve mobility, strength, balance and coordination. Required minimal verbal and manual cues to promote proper body alignment, promote proper body posture and promote proper body mechanics. Progressed resistance, range, repetitions and complexity of movement as indicated. MANUAL THERAPY: (see below for minutes): Joint mobilization and Soft tissue mobilization was utilized and necessary because of the patient's restricted joint motion, painful spasm, loss of articular motion and restricted motion of soft tissue. MODALITIES: (see below for minutes):      to decrease pain    SELF CARE: (see below for minutes): Procedure(s) (per grid) utilized to improve and/or restore self-care/home management as related to dressing, bathing and grooming. Required minimal verbal cueing to facilitate activities of daily living skills and compensatory activities.      Date: 2/18/22 2/25/22 (visit 2) 3/2/22 (visit 3) 3/3/22 (visit 4) 3/14/22 (visit 5)   Modalities:  15 min  15 min  15 min  15 min   Ice with IFC 15 min at end of session 15 min 15 min  15 min  15 min                    Therapeutic Exercise: 25 min  45 min  45 min  45 min  45 min    Stretching To 90 ° of flexion, 20 ° of ER repeat Continued to previous limits To 120 ° of flexion, 30 ° of ER  To R1 (150 ° flexion), 60 ° ER; 45 ° IR   Isometrics Into ER and IR at doorway [id number] each ER, IR, flexion, extension  Reviewed, will continue with HEP D/C    Elbow ROM 20x AROM       Scapular mobility  Retraction-protraction 20x, D2 20x SL D2 scap diagonals 30x     Bench press   Supine with wand 30x cueing scap protraction  Supine with wand cueing scap retraction 30x, supine flexion with wand 30x 20x supine with wand cueing scap retraction, 30x supine flexion; single arm punch 30x   Rhythmic stabilization   In supine 90 flexion; in SL ER/IR neutral 3x1 min each Repeat both    Abduction     SL 3x10   ER      SL 0# 3x10   Scaption     SL flexion 3x10   Extension     Prone 3x10                           Proprioceptive Activities:                                Manual Therapy:                        Therapeutic Activities:                                  HEP: see 2/18/22 flow sheet for specifics. [employer] Portal  Treatment/Session Summary:    · Response to Treatment:  Able to progress with AROM with good tolerance. R1 is progressing but remains mildly irritable at end range. Will continue to gradually progress ROM as allowed by healing timelines and patient comfort. · Communication/Consultation:  None today  · Equipment provided today:  None today  · Recommendations/Intent for next treatment session: Next visit will focus on progression of strength and return to prior level of function.     Total Treatment Billable Duration:  60 minutes  PT Patient Time In/Time Out  Time In: 3602  Time Out: 404 SOWMYA Beltran DPT    Future Appointments   Date Time Provider Kwasi Hernandez   3/17/2022  2:45 PM Angi Marks DPT [facility]   3/18/2022  3:30 PM Angi Marks DPT [facility]   3/21/2022 11:45 AM Angi Marks DPT [facility]   3/23/2022  8:00 AM Angi Marks DPT [facility]   3/30/2022  8:00 AM Angi Marks DPT [facility]   3/31/2022  4:15 PM Angi Marks DPT [facility]

## 2022-03-17 ENCOUNTER — HOSPITAL ENCOUNTER (OUTPATIENT)
Dept: PHYSICAL THERAPY | Age: 22
Discharge: HOME | End: 2022-03-17
Payer: COMMERCIAL

## 2022-03-17 PROCEDURE — 97014 ELECTRIC STIMULATION THERAPY: CPT

## 2022-03-17 PROCEDURE — 97110 THERAPEUTIC EXERCISES: CPT

## 2022-03-18 ENCOUNTER — HOSPITAL ENCOUNTER (OUTPATIENT)
Dept: PHYSICAL THERAPY | Age: 22
Discharge: HOME | End: 2022-03-18
Payer: COMMERCIAL

## 2022-03-18 PROCEDURE — 97110 THERAPEUTIC EXERCISES: CPT

## 2022-03-18 PROCEDURE — 97014 ELECTRIC STIMULATION THERAPY: CPT

## 2022-03-18 NOTE — PROGRESS NOTES
Chioma Read  : 2000  Primary: [facility] kaycee Keith*  Secondary: [employer] [address] Vaughn @ [address], [place].  [phone]   [phone]      OUTPATIENT PHYSICAL THERAPY: Daily Treatment Note  3/18/2022    ICD-10: Treatment Diagnosis: Pain in right arm (M79.601) and Pain in right shoulder (M25.511)  Effective Dates: 2022 TO 2022 (90 days). Frequency/Duration: 3 times a week for 90 Days  GOALS: (Goals have been discussed and agreed upon with patient.)  Short-Term Functional Goals: Time Frame: 4 weeks  1. Patient will be independent with HEP. 2. Patient will demonstrate sufficient healing to discontinue use of sling for daily activity. Discharge Goals: Time Frame: 16 weeks  1. Patient will restore mobility of shoulder to symmetric with uninvolved to return to prior level of function. 2. Patient will demonstrate 5/5 strength into ER at 90/90 to normalize dynamic stability required for return to prior level of function. 3. Patient will demonstrate single arm med ball throw with uninvolved to restore power production required for return to prior level of function. _________________________________________________________________________  Pre-treatment Symptoms/Complaints: \"I haven't been sore. \"   Pain: Initial: mild-moderate/10    DOS 2/10/22 s/p bankart with hill-sachs lesion  F/U 3/29 with MD Post Session:  No increase/10   Medications Last Reviewed:  3/18/2022  Updated Objective Findings:  Below measures from initial evaluation unless otherwise noted. Observation/Orthostatic Postural Assessment:    Mild winging of inferior angle of right scapula and elevation of right scapula. Palpation:    No specific palpable tenderness.    ROM:    All passive:   Flexion: 90 °   ER: 20 ° in scaption  IR: hand to stomach  Elbow, wrist and hand mobility are WNL  Strength:    is WNL  Further strength testing is not currently indicated. TREATMENT:   THERAPEUTIC ACTIVITY: ( see below for minutes): Therapeutic activities per grid below to improve mobility, strength, balance and coordination. Required minimal visual, verbal, manual and tactile cues to improve independence and safety with daily activities . THERAPEUTIC EXERCISE: (see below for minutes):  Exercises per grid below to improve mobility, strength, balance and coordination. Required minimal verbal and manual cues to promote proper body alignment, promote proper body posture and promote proper body mechanics. Progressed resistance, range, repetitions and complexity of movement as indicated. MANUAL THERAPY: (see below for minutes): Joint mobilization and Soft tissue mobilization was utilized and necessary because of the patient's restricted joint motion, painful spasm, loss of articular motion and restricted motion of soft tissue. MODALITIES: (see below for minutes):      to decrease pain    SELF CARE: (see below for minutes): Procedure(s) (per grid) utilized to improve and/or restore self-care/home management as related to dressing, bathing and grooming. Required minimal verbal cueing to facilitate activities of daily living skills and compensatory activities.      Date: 2/18/22 2/25/22 (visit 2) 3/2/22 (visit 3) 3/3/22 (visit 4) 3/14/22 (visit 5) 3/17/22 (visit 6) 3/19/22 (visit 7)   Modalities:  15 min  15 min  15 min  15 min 15 min  15 min    Ice with IFC 15 min at end of session 15 min 15 min  15 min  15 min  15 min  15 min                        Therapeutic Exercise: 25 min  45 min  45 min  45 min  45 min  45 min  45 min    Stretching To 90 ° of flexion, 20 ° of ER repeat Continued to previous limits To 120 ° of flexion, 30 ° of ER  To R1 (150 ° flexion), 60 ° ER; 45 ° IR repeat continued   Isometrics Into ER and IR at doorway [id number] each ER, IR, flexion, extension  Reviewed, will continue with HEP D/C      Elbow ROM 20x AROM         Scapular mobility  Retraction-protraction 20x, D2 20x SL D2 scap diagonals 30x       Bench press   Supine with wand 30x cueing scap protraction  Supine with wand cueing scap retraction 30x, supine flexion with wand 30x 20x supine with wand cueing scap retraction, 30x supine flexion; single arm punch 30x 30x supine with wand, 30x supine flexion with arm; single arm punch 30x  Repeat all   Rhythmic stabilization   In supine 90 flexion; in SL ER/IR neutral 3x1 min each Repeat both      Abduction     SL 3x10 SL 3x10 SL 3x10   ER      SL 0# 3x10 SL 0# 3x10 SL 0# 3x10   Scaption     SL flexion 3x10 SL flexion 3x10 SL flexion 3x10   Extension     Prone 3x10 Prone 3x10  Prone 3x10         Rhythmic stabilization 3x1 min  RS at 90 flexion and neutral ER/IR 3x1 min to all                       Proprioceptive Activities:                                        Manual Therapy:                              Therapeutic Activities:                                          HEP: see 2/18/22 flow sheet for specifics. [facility] [facility] Portal  Treatment/Session Summary:    · Response to Treatment:  Patient performs all exercises with good technique. Shoulder remains comfortable through available range. · Communication/Consultation:  None today  · Equipment provided today:  None today  · Recommendations/Intent for next treatment session: Next visit will focus on progression of strength and return to prior level of function.     Total Treatment Billable Duration:  60 minutes  PT Patient Time In/Time Out  Time In: 3856  Time Out: 404 SOWMYA Beltran DPT    Future Appointments   Date Time Provider Kwasi Hernandez   3/21/2022 11:45 AM JHOAN Chi [facility]   3/23/2022  8:00 AM JHOAN ChiS[facility]   3/30/2022  8:00 AM Jatin Lai DPT [facility]   3/31/2022  4:15 PM Jatin Lai, JHOAN [facility]   4/4/2022  3:30 PM Jatin Lai, JHOAN [facility]   4/6/2022  8:45 AM Ara De La Rosa L, DPT [facility]   4/8/2022 11:45 AM Gisel Kerri, DPT [facility]   4/11/2022  3:30 PM Gisel Kerri, DPT [facility]   4/14/2022  2:00 PM Gisel Kerri, DPT [facility]   4/18/2022  3:30 PM Gisel Kerri, DPT [facility]   4/20/2022  8:45 AM Gisel Kerri, DPT [facility]   4/22/2022 11:45 AM Gisel Kerri, DPT [facility]   4/25/2022  3:30 PM Gisel Kerri, DPT [facility]   4/27/2022  8:45 AM Gisel Kerri, DPT [facility]   4/29/2022  3:30 PM Gisel Kerri, DPT [facility]

## 2022-03-19 PROBLEM — S62.021A CLOSED DISPLACED FRACTURE OF MIDDLE THIRD OF NAVICULAR BONE OF RIGHT WRIST: Status: ACTIVE | Noted: 2021-09-15

## 2022-03-21 ENCOUNTER — HOSPITAL ENCOUNTER (OUTPATIENT)
Dept: PHYSICAL THERAPY | Age: 22
Discharge: HOME | End: 2022-03-21
Payer: COMMERCIAL

## 2022-03-21 PROCEDURE — 97014 ELECTRIC STIMULATION THERAPY: CPT

## 2022-03-21 PROCEDURE — 97110 THERAPEUTIC EXERCISES: CPT

## 2022-03-21 NOTE — PROGRESS NOTES
Chioma Read  : 2000  Primary: [facility] kaycee Keith*  Secondary: [employer] [address] @ [place] [address].  [phone]   [phone]      OUTPATIENT PHYSICAL THERAPY: Daily Treatment Note  3/21/2022    ICD-10: Treatment Diagnosis: Pain in right arm (M79.601) and Pain in right shoulder (M25.511)  Effective Dates: 2022 TO 2022 (90 days). Frequency/Duration: 3 times a week for 90 Days  GOALS: (Goals have been discussed and agreed upon with patient.)  Short-Term Functional Goals: Time Frame: 4 weeks  1. Patient will be independent with HEP. 2. Patient will demonstrate sufficient healing to discontinue use of sling for daily activity. Discharge Goals: Time Frame: 16 weeks  1. Patient will restore mobility of shoulder to symmetric with uninvolved to return to prior level of function. 2. Patient will demonstrate 5/5 strength into ER at 90/90 to normalize dynamic stability required for return to prior level of function. 3. Patient will demonstrate single arm med ball throw with uninvolved to restore power production required for return to prior level of function. _________________________________________________________________________  Pre-treatment Symptoms/Complaints: Reports some soreness with active movement of the arm, but feels like he is generally improving. Pain: Initial: mild-moderate/10    DOS 2/10/22 s/p bankart with hill-sachs lesion  F/U 3/29 with MD Post Session:  No increase/10   Medications Last Reviewed:  3/21/2022  Updated Objective Findings:  Below measures from initial evaluation unless otherwise noted. Observation/Orthostatic Postural Assessment:    Mild winging of inferior angle of right scapula and elevation of right scapula. Palpation:    No specific palpable tenderness.    ROM:    All passive:   Flexion: 90 °   ER: 20 ° in scaption  IR: hand to stomach  Elbow, wrist and hand mobility are WNL  Strength:    is WNL  Further strength testing is not currently indicated. TREATMENT:   THERAPEUTIC ACTIVITY: ( see below for minutes): Therapeutic activities per grid below to improve mobility, strength, balance and coordination. Required minimal visual, verbal, manual and tactile cues to improve independence and safety with daily activities . THERAPEUTIC EXERCISE: (see below for minutes):  Exercises per grid below to improve mobility, strength, balance and coordination. Required minimal verbal and manual cues to promote proper body alignment, promote proper body posture and promote proper body mechanics. Progressed resistance, range, repetitions and complexity of movement as indicated. MANUAL THERAPY: (see below for minutes): Joint mobilization and Soft tissue mobilization was utilized and necessary because of the patient's restricted joint motion, painful spasm, loss of articular motion and restricted motion of soft tissue. MODALITIES: (see below for minutes):      to decrease pain    SELF CARE: (see below for minutes): Procedure(s) (per grid) utilized to improve and/or restore self-care/home management as related to dressing, bathing and grooming. Required minimal verbal cueing to facilitate activities of daily living skills and compensatory activities.      Petros 2/18/22 2/25/22 (visit 2) 3/2/22 (visit 3) 3/3/22 (visit 4) 3/14/22 (visit 5) 3/17/22 (visit 6) 3/19/22 (visit 7) 3/21/22 (visit 8)    Modalities:  15 min  15 min  15 min  15 min 15 min  15 min  15 min     Ice with IFC 15 min at end of session 15 min 15 min  15 min  15 min  15 min  15 min  15 min                             Therapeutic Exercise: 25 min  45 min  45 min  45 min  45 min  45 min  45 min  45 min     Stretching To 90 ° of flexion, 20 ° of ER repeat Continued to previous limits To 120 ° of flexion, 30 ° of ER  To R1 (150 ° flexion), 60 ° ER; 45 ° IR repeat continued To R 1    Isometrics Into ER and IR at doorway [id number] each ER, IR, flexion, extension  Reviewed, will continue with HEP D/C        Elbow ROM 20x AROM           Scapular mobility  Retraction-protraction 20x, D2 20x SL D2 scap diagonals 30x         Bench press   Supine with wand 30x cueing scap protraction  Supine with wand cueing scap retraction 30x, supine flexion with wand 30x 20x supine with wand cueing scap retraction, 30x supine flexion; single arm punch 30x 30x supine with wand, 30x supine flexion with arm; single arm punch 30x  Repeat all 30x bench press; 30x supine flexion with ARM; single arm punch 30x bottoms up KB 5# 3x10    Rhythmic stabilization   In supine 90 flexion; in SL ER/IR neutral 3x1 min each Repeat both        Abduction     SL 3x10 SL 3x10 SL 3x10 SL 3x10    ER      SL 0# 3x10 SL 0# 3x10 SL 0# 3x10 SL 1# 3x10    Scaption     SL flexion 3x10 SL flexion 3x10 SL flexion 3x10 SL flexion 1# 3x10    Extension     Prone 3x10 Prone 3x10  Prone 3x10 Prone 1# 3x10          Rhythmic stabilization 3x1 min  RS at 90 flexion and neutral ER/IR 3x1 min to all repeat                            Proprioceptive Activities:                                                Manual Therapy:                                    Therapeutic Activities:                                                  HEP: see 2/18/22 flow sheet for specifics. [employer] Portal  Treatment/Session Summary:    · Response to Treatment:  Progressed with load on movements through comfortable range of motion with good tolerance. · Communication/Consultation:  None today  · Equipment provided today:  None today  · Recommendations/Intent for next treatment session: Next visit will focus on progression of strength and return to prior level of function.     Total Treatment Billable Duration:  60 minutes  PT Patient Time In/Time Out  Time In: 6204  Time Out: [address]    Future Appointments   Date Time Provider Kwasi Hernandez   3/23/2022  8:00 AM Ara De La Rosa L, DPT [facility]   3/30/2022  8:00 AM Janann Callow, DPT [facility]   3/31/2022  4:15 PM [facility]allow[facility]   4/4/2022  3:30 PM Janann Callow, DPT [facility]   4/6/2022  8:45 AM Janann Callow, DPT [facility]   4/8/2022 11:45 AM Janann Callow, DPT [facility]   4/11/2022  3:30 PM Janann Callow, DPT [facility]   4/14/2022  2:00 PM Janmarck Callow, DPT [facility]   4/18/2022  3:30 PM Janann Callow, DPT [facility]   4/20/2022  8:45 AM Janmarck Callow, DPT [facility]   4/22/2022 11:45 AM Janann Callow, DPT [facility]   4/25/2022  3:30 PM Janann Callow, DPT [facility]   4/27/2022  8:45 AM Janann Callow, DPT [facility]   4/29/2022  3:30 PM Janann Callow, DPT [facility]

## 2022-03-23 ENCOUNTER — APPOINTMENT (OUTPATIENT)
Dept: PHYSICAL THERAPY | Age: 22
End: 2022-03-23
Payer: COMMERCIAL

## 2022-03-30 ENCOUNTER — HOSPITAL ENCOUNTER (OUTPATIENT)
Dept: PHYSICAL THERAPY | Age: 22
Discharge: HOME | End: 2022-03-30
Payer: COMMERCIAL

## 2022-03-30 PROCEDURE — 97014 ELECTRIC STIMULATION THERAPY: CPT

## 2022-03-30 PROCEDURE — 97110 THERAPEUTIC EXERCISES: CPT

## 2022-03-30 NOTE — PROGRESS NOTES
Axel Libman  : 2000  Primary: [facility] Keith*  Secondary: [employer] [address]  [phone]   [phone]      OUTPATIENT PHYSICAL THERAPY: Daily Treatment Note and Progress Note  3/30/2022    ICD-10: Treatment Diagnosis: Pain in right arm (M79.601) and Pain in right shoulder (M25.511)  Effective Dates: 2022 TO 2022 (90 days). Frequency/Duration: 3 times a week for 90 Days  GOALS: (Goals have been discussed and agreed upon with patient.)  Short-Term Functional Goals: Time Frame: 4 weeks MET  1. Patient will be independent with HEP. 2. Patient will demonstrate sufficient healing to discontinue use of sling for daily activity. Discharge Goals: Time Frame: 16 weeks ONGOING  1. Patient will restore mobility of shoulder to symmetric with uninvolved to return to prior level of function. 2. Patient will demonstrate 5/5 strength into ER at 90/90 to normalize dynamic stability required for return to prior level of function. 3. Patient will demonstrate single arm med ball throw with uninvolved to restore power production required for return to prior level of function. _________________________________________________________________________  Pre-treatment Symptoms/Complaints: had follow up with MD. Discontinued the sling. Notes the shoulder has been a little more sore with being out of the sling. Pain: Initial: mild-moderate/10    DOS 2/10/22 s/p bankart with hill-sachs lesion  F/U 3/29 with MD Post Session:  No increase/10   Medications Last Reviewed:  3/30/2022  Updated Objective Findings:  Below measures from initial evaluation unless otherwise noted. Observation/Orthostatic Postural Assessment:    Mild winging of inferior angle of right scapula and elevation of right scapula. Palpation:    No specific palpable tenderness.    ROM:    All passive:   Flexion: 90 °   ER: 20 ° in scaption  IR: hand to stomach  Elbow, wrist and hand mobility are WNL  Strength:    is WNL  Further strength testing is not currently indicated. 3/30/22 update:   Scaption: 160 °; 4/5   ER: 70 ° in scaption; 4/5   IR: 60 °; 4+/5      TREATMENT:   THERAPEUTIC ACTIVITY: ( see below for minutes): Therapeutic activities per grid below to improve mobility, strength, balance and coordination. Required minimal visual, verbal, manual and tactile cues to improve independence and safety with daily activities . THERAPEUTIC EXERCISE: (see below for minutes):  Exercises per grid below to improve mobility, strength, balance and coordination. Required minimal verbal and manual cues to promote proper body alignment, promote proper body posture and promote proper body mechanics. Progressed resistance, range, repetitions and complexity of movement as indicated. MANUAL THERAPY: (see below for minutes): Joint mobilization and Soft tissue mobilization was utilized and necessary because of the patient's restricted joint motion, painful spasm, loss of articular motion and restricted motion of soft tissue. MODALITIES: (see below for minutes):      to decrease pain    SELF CARE: (see below for minutes): Procedure(s) (per grid) utilized to improve and/or restore self-care/home management as related to dressing, bathing and grooming. Required minimal verbal cueing to facilitate activities of daily living skills and compensatory activities.      Petros 2/18/22 2/25/22 (visit 2) 3/2/22 (visit 3) 3/3/22 (visit 4) 3/14/22 (visit 5) 3/17/22 (visit 6) 3/19/22 (visit 7) 3/21/22 (visit 8) 3/30/22 (visit 9)   Modalities:  15 min  15 min  15 min  15 min 15 min  15 min  15 min  15 min    Ice with IFC 15 min at end of session 15 min 15 min  15 min  15 min  15 min  15 min  15 min  15 min                            Therapeutic Exercise: 25 min  45 min  45 min  45 min  45 min  45 min  45 min  45 min  45 min    Stretching To 90 ° of flexion, 20 ° of ER repeat Continued to previous limits To 120 ° of flexion, 30 ° of ER  To R1 (150 ° flexion), 60 ° ER; 45 ° IR repeat continued To R 1 Active warm up: thoracic rotation with hand behind head, back 15x B    Isometrics Into ER and IR at doorway [id number] each ER, IR, flexion, extension  Reviewed, will continue with HEP D/C     Rhythmic stabilization: 3x1 min in 90 flexion, ER/IR   Elbow ROM 20x AROM           Scapular mobility  Retraction-protraction 20x, D2 20x SL D2 scap diagonals 30x         Bench press   Supine with wand 30x cueing scap protraction  Supine with wand cueing scap retraction 30x, supine flexion with wand 30x 20x supine with wand cueing scap retraction, 30x supine flexion; single arm punch 30x 30x supine with wand, 30x supine flexion with arm; single arm punch 30x  Repeat all 30x bench press; 30x supine flexion with ARM; single arm punch 30x bottoms up KB 5# 3x10 Supine punch 5# 3x15 with bottom up KB    Rhythmic stabilization   In supine 90 flexion; in SL ER/IR neutral 3x1 min each Repeat both        Abduction     SL 3x10 SL 3x10 SL 3x10 SL 3x10    ER      SL 0# 3x10 SL 0# 3x10 SL 0# 3x10 SL 1# 3x10 SL 1# 3x15   Scaption     SL flexion 3x10 SL flexion 3x10 SL flexion 3x10 SL flexion 1# 3x10 Standing 1# x10; 2# 2x10   Extension     Prone 3x10 Prone 3x10  Prone 3x10 Prone 1# 3x10 Prone horizontal abduction 3x10 with 5sec hold, prone scaption 3x10 with 5 sec hold         Rhythmic stabilization 3x1 min  RS at 90 flexion and neutral ER/IR 3x1 min to all repeat See above   Row         15# x15; 25# 2x15               Proprioceptive Activities:                                                Manual Therapy:                                    Therapeutic Activities:                                                  HEP: see 2/18/22 flow sheet for specifics.     [employer] Portal  Treatment/Session Summary:    · Response to Treatment:  With thoracic rotation drills, he demonstrates decreased scapular mobility limiting range of motion with reaching behind his head and back. · Communication/Consultation:  None today  · Equipment provided today:  None today  · Recommendations/Intent for next treatment session: Next visit will focus on progression of strength and return to prior level of function.     Total Treatment Billable Duration:  60 minutes  PT Patient Time In/Time Out  Time In: 0800  Time Out: 0900  Jose Raul Sarabia DPT    Future Appointments   Date Time Provider Kwasi Hernandez   3/31/2022  4:15 PM Yessenia Baker, [facility]   4/4/2022 11:45 AM [facility]   4/6/2022  8:45 AM [facility]   4/8/2022 11:45 AM [facility]   4/11/2022  3:30 PM [facility]   4/14/2022  2:00 PM Yessenia Baker, DPT [facility]   4/18/2022  3:30 PM Yessenia Baker, DPT [facility]   4/20/2022  8:45 AM Yessenia Baker, [facility]   4/22/2022 11:45 AM Yessenia Baker, DPT [facility]   4/25/2022  3:30 PM Yessenia Baker, DPT [facility]   4/27/2022  8:45 AM [facility] Baker[facility]   4/29/2022  3:30 PM Yessenia Baker, DPT [facility]

## 2022-03-31 ENCOUNTER — HOSPITAL ENCOUNTER (OUTPATIENT)
Dept: PHYSICAL THERAPY | Age: 22
Discharge: HOME | End: 2022-03-31
Payer: COMMERCIAL

## 2022-03-31 PROCEDURE — 97014 ELECTRIC STIMULATION THERAPY: CPT

## 2022-03-31 PROCEDURE — 97110 THERAPEUTIC EXERCISES: CPT

## 2022-03-31 NOTE — PROGRESS NOTES
Nessa Polanco  : 2000  Primary: [facility] kaycee Keith*  Secondary: [employer] Salomon Villarreal @ [address], [place].  [phone]   [phone]      OUTPATIENT PHYSICAL THERAPY: Daily Treatment Note  3/31/2022    ICD-10: Treatment Diagnosis: Pain in right arm (M79.601) and Pain in right shoulder (M25.511)  Effective Dates: 2022 TO 2022 (90 days). Frequency/Duration: 3 times a week for 90 Days  GOALS: (Goals have been discussed and agreed upon with patient.)  Short-Term Functional Goals: Time Frame: 4 weeks MET  1. Patient will be independent with HEP. 2. Patient will demonstrate sufficient healing to discontinue use of sling for daily activity. Discharge Goals: Time Frame: 16 weeks ONGOING  1. Patient will restore mobility of shoulder to symmetric with uninvolved to return to prior level of function. 2. Patient will demonstrate 5/5 strength into ER at 90/90 to normalize dynamic stability required for return to prior level of function. 3. Patient will demonstrate single arm med ball throw with uninvolved to restore power production required for return to prior level of function. _________________________________________________________________________  Pre-treatment Symptoms/Complaints: Notes soreness is generally improving. Still having some difficulty with getting comfortable getting to sleep due to positioning. Pain: Initial: mild-moderate/10    DOS 2/10/22 s/p bankart with hill-sachs lesion  F/U 3/29 with MD Post Session:  No increase/10   Medications Last Reviewed:  3/31/2022  Updated Objective Findings:  Below measures from initial evaluation unless otherwise noted. Observation/Orthostatic Postural Assessment:    Mild winging of inferior angle of right scapula and elevation of right scapula. Palpation:    No specific palpable tenderness.    ROM:    All passive:   Flexion: 90 °   ER: 20 ° in scaption  IR: hand to stomach  Elbow, wrist and hand mobility are WNL  Strength:    is WNL  Further strength testing is not currently indicated. 3/30/22 update:   Scaption: 160 °; 4/5   ER: 70 ° in scaption; 4/5   IR: 60 °; 4+/5      TREATMENT:   THERAPEUTIC ACTIVITY: ( see below for minutes): Therapeutic activities per grid below to improve mobility, strength, balance and coordination. Required minimal visual, verbal, manual and tactile cues to improve independence and safety with daily activities . THERAPEUTIC EXERCISE: (see below for minutes):  Exercises per grid below to improve mobility, strength, balance and coordination. Required minimal verbal and manual cues to promote proper body alignment, promote proper body posture and promote proper body mechanics. Progressed resistance, range, repetitions and complexity of movement as indicated. MANUAL THERAPY: (see below for minutes): Joint mobilization and Soft tissue mobilization was utilized and necessary because of the patient's restricted joint motion, painful spasm, loss of articular motion and restricted motion of soft tissue. MODALITIES: (see below for minutes):      to decrease pain    SELF CARE: (see below for minutes): Procedure(s) (per grid) utilized to improve and/or restore self-care/home management as related to dressing, bathing and grooming. Required minimal verbal cueing to facilitate activities of daily living skills and compensatory activities.      Petros 2/18/22 2/25/22 (visit 2) 3/2/22 (visit 3) 3/3/22 (visit 4) 3/14/22 (visit 5) 3/17/22 (visit 6) 3/19/22 (visit 7) 3/21/22 (visit 8) 3/30/22 (visit 9) PN 3/31/22 (visit 10)   Modalities:  15 min  15 min  15 min  15 min 15 min  15 min  15 min  15 min  15 min   Ice with IFC 15 min at end of session 15 min 15 min  15 min  15 min  15 min  15 min  15 min  15 min                               Therapeutic Exercise: 25 min  45 min  45 min  45 min  45 min  45 min  45 min  45 min  45 min  40 min Stretching To 90 ° of flexion, 20 ° of ER repeat Continued to previous limits To 120 ° of flexion, 30 ° of ER  To R1 (150 ° flexion), 60 ° ER; 45 ° IR repeat continued To R 1 Active warm up: thoracic rotation with hand behind head, back 15x B  repeat   Isometrics Into ER and IR at doorway [id number] each ER, IR, flexion, extension  Reviewed, will continue with HEP D/C     Rhythmic stabilization: 3x1 min in 90 flexion, ER/IR repeat   Elbow ROM 20x AROM            Scapular mobility  Retraction-protraction 20x, D2 20x SL D2 scap diagonals 30x          Bench press   Supine with wand 30x cueing scap protraction  Supine with wand cueing scap retraction 30x, supine flexion with wand 30x 20x supine with wand cueing scap retraction, 30x supine flexion; single arm punch 30x 30x supine with wand, 30x supine flexion with arm; single arm punch 30x  Repeat all 30x bench press; 30x supine flexion with ARM; single arm punch 30x bottoms up KB 5# 3x10 Supine punch 5# 3x15 with bottom up KB     Rhythmic stabilization   In supine 90 flexion; in SL ER/IR neutral 3x1 min each Repeat both         Abduction     SL 3x10 SL 3x10 SL 3x10 SL 3x10     ER      SL 0# 3x10 SL 0# 3x10 SL 0# 3x10 SL 1# 3x10 SL 1# 3x15 Red 3x15; IR red 3x15   Scaption     SL flexion 3x10 SL flexion 3x10 SL flexion 3x10 SL flexion 1# 3x10 Standing 1# x10; 2# 2x10 Standing 2# 3x15   Extension     Prone 3x10 Prone 3x10  Prone 3x10 Prone 1# 3x10 Prone horizontal abduction 3x10 with 5sec hold, prone scaption 3x10 with 5 sec hold Y, T, W 2x10 with 5 sec hold         Rhythmic stabilization 3x1 min  RS at 90 flexion and neutral ER/IR 3x1 min to all repeat See above    Row         15# x15; 25# 2x15 25# 3x15                Proprioceptive Activities:                                                    Manual Therapy:                                       Therapeutic Activities:                                                      HEP: see 2/18/22 flow sheet for specifics. [facility] Portal  Treatment/Session Summary:    · Response to Treatment:  Thoracic rotation is limited on the right. This decreased thoracic mobility contributes to restricted scapular motion which restricts range overhead and behind his back. · Communication/Consultation:  None today  · Equipment provided today:  None today  · Recommendations/Intent for next treatment session: Next visit will focus on progression of strength and return to prior level of function.     Total Treatment Billable Duration:  55 minutes  PT Patient Time In/Time Out  Time In: 1615  Time Out: [address], DPT    Future Appointments   Date Time Provider Kwasi Hernandez   4/4/2022 11:45 AM Saloni Nap, DPT [facility]   4/6/2022  8:45 AM Saloni Nap, DPT [facility]   4/8/2022 11:45 AM Saloni Nap, DPT [facility]   4/11/2022  3:30 PM Saloni Nap, DPT [facility]   4/14/2022  2:00 PM Saloni Nap, DPT [facility]   4/18/2022  3:30 PM Saloni Nap, DPT [facility]   4/20/2022  8:45 AM Saloni Nap, DPT [facility]   4/22/2022 11:45 AM Saloni Nap, DPT [facility]   4/25/2022  3:30 PM Saloni Nap, DPT [facility]   4/27/2022  8:45 AM Saloin Nap, DPT [facility]   4/29/2022  3:30 PM Saloni Nap, DPT [facility]

## 2022-04-04 ENCOUNTER — HOSPITAL ENCOUNTER (OUTPATIENT)
Dept: PHYSICAL THERAPY | Age: 22
Discharge: HOME | End: 2022-04-04
Payer: COMMERCIAL

## 2022-04-04 PROCEDURE — 97110 THERAPEUTIC EXERCISES: CPT

## 2022-04-04 PROCEDURE — 97014 ELECTRIC STIMULATION THERAPY: CPT

## 2022-04-06 ENCOUNTER — HOSPITAL ENCOUNTER (OUTPATIENT)
Dept: PHYSICAL THERAPY | Age: 22
Discharge: HOME | End: 2022-04-06
Payer: COMMERCIAL

## 2022-04-06 PROCEDURE — 97014 ELECTRIC STIMULATION THERAPY: CPT

## 2022-04-06 PROCEDURE — 97110 THERAPEUTIC EXERCISES: CPT

## 2022-04-06 NOTE — PROGRESS NOTES
Oneal Ledesma  : 2000  Primary: Claudia Gutierrez [facility] [place]*  Secondary: [employer] [address] @ [address], [facility].  [phone]   [phone]      OUTPATIENT PHYSICAL THERAPY: Daily Treatment Note  2022    ICD-10: Treatment Diagnosis: Pain in right arm (M79.601) and Pain in right shoulder (M25.511)  Effective Dates: 2022 TO 2022 (90 days). Frequency/Duration: 3 times a week for 90 Days  GOALS: (Goals have been discussed and agreed upon with patient.)  Short-Term Functional Goals: Time Frame: 4 weeks MET  1. Patient will be independent with HEP. 2. Patient will demonstrate sufficient healing to discontinue use of sling for daily activity. Discharge Goals: Time Frame: 16 weeks ONGOING  1. Patient will restore mobility of shoulder to symmetric with uninvolved to return to prior level of function. 2. Patient will demonstrate 5/5 strength into ER at 90/90 to normalize dynamic stability required for return to prior level of function. 3. Patient will demonstrate single arm med ball throw with uninvolved to restore power production required for return to prior level of function. _________________________________________________________________________  Pre-treatment Symptoms/Complaints: pt notes shoulder soreness is diminishing. Still present, but not as intense. Pain: Initial: mild-moderate/10    DOS 2/10/22 s/p bankart with hill-sachs lesion  F/U 3/29 with MD Post Session:  No increase/10   Medications Last Reviewed:  2022  Updated Objective Findings:  Below measures from initial evaluation unless otherwise noted. Observation/Orthostatic Postural Assessment:    Mild winging of inferior angle of right scapula and elevation of right scapula. Palpation:    No specific palpable tenderness.    ROM:    All passive:   Flexion: 90 °   ER: 20 ° in scaption  IR: hand to stomach  Elbow, wrist and hand mobility are WNL  Strength:    is WNL  Further strength testing is not currently indicated. 3/30/22 update:   Scaption: 160 °; 4/5   ER: 70 ° in scaption; 4/5   IR: 60 °; 4+/5      TREATMENT:   THERAPEUTIC ACTIVITY: ( see below for minutes): Therapeutic activities per grid below to improve mobility, strength, balance and coordination. Required minimal visual, verbal, manual and tactile cues to improve independence and safety with daily activities . THERAPEUTIC EXERCISE: (see below for minutes):  Exercises per grid below to improve mobility, strength, balance and coordination. Required minimal verbal and manual cues to promote proper body alignment, promote proper body posture and promote proper body mechanics. Progressed resistance, range, repetitions and complexity of movement as indicated. MANUAL THERAPY: (see below for minutes): Joint mobilization and Soft tissue mobilization was utilized and necessary because of the patient's restricted joint motion, painful spasm, loss of articular motion and restricted motion of soft tissue. MODALITIES: (see below for minutes):      to decrease pain    SELF CARE: (see below for minutes): Procedure(s) (per grid) utilized to improve and/or restore self-care/home management as related to dressing, bathing and grooming. Required minimal verbal cueing to facilitate activities of daily living skills and compensatory activities.      Petros 3/17/22 (visit 6) 3/19/22 (visit 7) 3/21/22 (visit 8) 3/30/22 (visit 9) PN 3/31/22 (visit 10) 4/4/22 (visit 11) 4/6/22 (visit 12)    Modalities: 15 min  15 min  15 min  15 min  15 min 15 min  15 min     Ice with IFC 15 min  15 min  15 min  15 min  15 min  15 min  15 min                           Therapeutic Exercise: 45 min  45 min  45 min  45 min  40 min 40 min  45 min     Stretching repeat continued To R 1 Active warm up: thoracic rotation with hand behind head, back 15x B  repeat Therapist assisted stretching into all planes with gentle distraction and mobilization Therapist assisted stretching into all planes; thoracic rotation with hand behind head, back 15x B     Isometrics    Rhythmic stabilization: 3x1 min in 90 flexion, ER/IR repeat      Elbow ROM           Scapular mobility           Bench press 30x supine with wand, 30x supine flexion with arm; single arm punch 30x  Repeat all 30x bench press; 30x supine flexion with ARM; single arm punch 30x bottoms up KB 5# 3x10 Supine punch 5# 3x15 with bottom up KB   Cane AAROM into flexion Cane AAROM into bench press, flexion 20x    Rhythmic stabilization           Abduction SL 3x10 SL 3x10 SL 3x10   SL 2# 3x10 Prone horizontal abduction 3x10 with 5 sec hold; SL abduction2# 3x15    ER  SL 0# 3x10 SL 0# 3x10 SL 1# 3x10 SL 1# 3x15 Red 3x15; IR red 3x15 SL 2# 3x10; IR red 3x15 ER, IR and extension red tband 3x15 to all     Scaption SL flexion 3x10 SL flexion 3x10 SL flexion 1# 3x10 Standing 1# x10; 2# 2x10 Standing 2# 3x15 Standing 2# 3x10 Standing 2# 4x10     Extension Prone 3x10  Prone 3x10 Prone 1# 3x10 Prone horizontal abduction 3x10 with 5sec hold, prone scaption 3x10 with 5 sec hold Y, T, W 2x10 with 5 sec hold Red tband 3x15 See above     Rhythmic stabilization 3x1 min  RS at 90 flexion and neutral ER/IR 3x1 min to all repeat See above       Row    15# x15; 25# 2x15 25# 3x15 25# 3x15                Proprioceptive Activities:                                            Manual Therapy:                                 Therapeutic Activities:                                              HEP: see 2/18/22 flow sheet for specifics. "[employer]  Treatment/Session Summary:    · Response to Treatment:  Irritability is continuing to decrease allowing for increased loading. Fatigues easily with external rotation due to decreased endurance capacity of the posterior rotator cuff.    · Communication/Consultation:  None today  · Equipment provided today:  None today  · Recommendations/Intent for next treatment session: Next visit will focus on progression of strength and return to prior level of function.     Total Treatment Billable Duration:  55 minutes  PT Patient Time In/Time Out  Time In: 0845  Time Out: Skylar Vance 1729, DPT    Future Appointments   Date Time Provider Kwasi Hernandez   4/8/2022 11:45 AM Delfino Smiley, DPT [facility]   4/11/2022  3:30 PM Delfino Smiley, CHRISST [facility]   4/14/2022  2:00 PM Delfinolorraine Smiley, DPT [facility]   4/18/2022  3:30 PM Delfinolorraine Smiley, DPT [facility]   4/20/2022  8:45 AM Delfino Smiley, DPT [facility]   4/22/2022 11:45 AM Delfino Smiley, DPT [facility]   4/25/2022  3:30 PM Delfino Smiley, DPT [facility]   4/27/2022  8:45 AM [facility]   4/29/2022  3:30 PM Delfino Smiley, DPT [facility]

## 2022-04-08 ENCOUNTER — HOSPITAL ENCOUNTER (OUTPATIENT)
Dept: PHYSICAL THERAPY | Age: 22
Discharge: HOME | End: 2022-04-08
Payer: COMMERCIAL

## 2022-04-08 PROCEDURE — 97110 THERAPEUTIC EXERCISES: CPT

## 2022-04-08 PROCEDURE — 97014 ELECTRIC STIMULATION THERAPY: CPT

## 2022-04-08 NOTE — PROGRESS NOTES
Wes Hidalgo  : 2000  Primary: Gerald Glover Of [facility]*  Secondary: [employer] [address] @ [address], [place].  [phone]   [phone]      OUTPATIENT PHYSICAL THERAPY: Daily Treatment Note  2022    ICD-10: Treatment Diagnosis: Pain in right arm (M79.601) and Pain in right shoulder (M25.511)  Effective Dates: 2022 TO 2022 (90 days). Frequency/Duration: 3 times a week for 90 Days  GOALS: (Goals have been discussed and agreed upon with patient.)  Short-Term Functional Goals: Time Frame: 4 weeks MET  1. Patient will be independent with HEP. 2. Patient will demonstrate sufficient healing to discontinue use of sling for daily activity. Discharge Goals: Time Frame: 16 weeks ONGOING  1. Patient will restore mobility of shoulder to symmetric with uninvolved to return to prior level of function. 2. Patient will demonstrate 5/5 strength into ER at 90/90 to normalize dynamic stability required for return to prior level of function. 3. Patient will demonstrate single arm med ball throw with uninvolved to restore power production required for return to prior level of function. _________________________________________________________________________  Pre-treatment Symptoms/Complaints: Notes soreness has resolved. Feeling better with mobility drills. Continuing to feel tight with reaching behind his back. Pain: Initial: mild-moderate/10    DOS 2/10/22 s/p bankart with hill-sachs lesion  F/U 3/29 with MD Post Session:  No increase/10   Medications Last Reviewed:  2022  Updated Objective Findings:  Below measures from initial evaluation unless otherwise noted. Observation/Orthostatic Postural Assessment:    Mild winging of inferior angle of right scapula and elevation of right scapula. Palpation:    No specific palpable tenderness.    ROM:    All passive:   Flexion: 90 °   ER: 20 ° in scaption  IR: hand to stomach  Elbow, wrist and hand mobility are WNL  Strength:    is WNL  Further strength testing is not currently indicated. 3/30/22 update:   Scaption: 160 °; 4/5   ER: 70 ° in scaption; 4/5   IR: 60 °; 4+/5      TREATMENT:   THERAPEUTIC ACTIVITY: ( see below for minutes): Therapeutic activities per grid below to improve mobility, strength, balance and coordination. Required minimal visual, verbal, manual and tactile cues to improve independence and safety with daily activities . THERAPEUTIC EXERCISE: (see below for minutes):  Exercises per grid below to improve mobility, strength, balance and coordination. Required minimal verbal and manual cues to promote proper body alignment, promote proper body posture and promote proper body mechanics. Progressed resistance, range, repetitions and complexity of movement as indicated. MANUAL THERAPY: (see below for minutes): Joint mobilization and Soft tissue mobilization was utilized and necessary because of the patient's restricted joint motion, painful spasm, loss of articular motion and restricted motion of soft tissue. MODALITIES: (see below for minutes):      to decrease pain    SELF CARE: (see below for minutes): Procedure(s) (per grid) utilized to improve and/or restore self-care/home management as related to dressing, bathing and grooming. Required minimal verbal cueing to facilitate activities of daily living skills and compensatory activities.      Petros 3/17/22 (visit 6) 3/19/22 (visit 7) 3/21/22 (visit 8) 3/30/22 (visit 9) PN 3/31/22 (visit 10) 4/4/22 (visit 11) 4/6/22 (visit 12) 4/8/22 (visit 13)   Modalities: 15 min  15 min  15 min  15 min  15 min 15 min  15 min  15 min    Ice with IFC 15 min  15 min  15 min  15 min  15 min  15 min  15 min  15 min                          Therapeutic Exercise: 45 min  45 min  45 min  45 min  40 min 40 min  45 min  45 min    Stretching repeat continued To R 1 Active warm up: thoracic rotation with hand behind head, back 15x B  repeat Therapist assisted stretching into all planes with gentle distraction and mobilization Therapist assisted stretching into all planes; thoracic rotation with hand behind head, back 15x B  Thoracic rotation with hand behind head, back, reach-roll-lift 15x B each   Isometrics    Rhythmic stabilization: 3x1 min in 90 flexion, ER/IR repeat      Elbow ROM           Scapular mobility           Bench press 30x supine with wand, 30x supine flexion with arm; single arm punch 30x  Repeat all 30x bench press; 30x supine flexion with ARM; single arm punch 30x bottoms up KB 5# 3x10 Supine punch 5# 3x15 with bottom up KB   Cane AAROM into flexion Cane AAROM into bench press, flexion 20x Supine punch 10# 3x15 bottom up KB   Rhythmic stabilization           Abduction SL 3x10 SL 3x10 SL 3x10   SL 2# 3x10 Prone horizontal abduction 3x10 with 5 sec hold; SL abduction2# 3x15    ER  SL 0# 3x10 SL 0# 3x10 SL 1# 3x10 SL 1# 3x15 Red 3x15; IR red 3x15 SL 2# 3x10; IR red 3x15 ER, IR and extension red tband 3x15 to all  ER 2# 3x15; IR green 3x15    Scaption SL flexion 3x10 SL flexion 3x10 SL flexion 1# 3x10 Standing 1# x10; 2# 2x10 Standing 2# 3x15 Standing 2# 3x10 Standing 2# 4x10  2# 3x15   Extension Prone 3x10  Prone 3x10 Prone 1# 3x10 Prone horizontal abduction 3x10 with 5sec hold, prone scaption 3x10 with 5 sec hold Y, T, W 2x10 with 5 sec hold Red tband 3x15 See above Y, T, W 3x10 with 5 sec hold    Rhythmic stabilization 3x1 min  RS at 90 flexion and neutral ER/IR 3x1 min to all repeat See above       Row    15# x15; 25# 2x15 25# 3x15 25# 3x15  25# 3x15              Proprioceptive Activities:                                            Manual Therapy:                                 Therapeutic Activities:                                              HEP: see 2/18/22 flow sheet for specifics.     [employer] Portal  Treatment/Session Summary:    · Response to Treatment:  With resolving joint soreness, mobility is progressing into ER and IR. · Communication/Consultation:  None today  · Equipment provided today:  None today  · Recommendations/Intent for next treatment session: Next visit will focus on progression of strength and return to prior level of function.     Total Treatment Billable Duration:  60 minutes  PT Patient Time In/Time Out  Time In: 1508  Time Out: [address], DPT    Future Appointments   Date Time Provider Kwasi Hernandez   4/11/2022  3:30 PM Conrado Apa, DPT [facility]   4/14/2022  2:00 PM Conrado Apa, DPT [facility]   4/18/2022  3:30 PM Conrado Apa, DPT [facility]   4/20/2022  8:45 AM Conrado Apa, DPT [facility]   4/22/2022 11:45 AM Conrado Apa, DPT [facility]   4/25/2022  3:30 PM Conrado Apa, DPT [facility]   4/27/2022  8:45 AM Conrado Apa, DPT [facility]   4/29/2022  3:30 PM Conrado Apa, DPT [facility]

## 2022-04-11 ENCOUNTER — HOSPITAL ENCOUNTER (OUTPATIENT)
Dept: PHYSICAL THERAPY | Age: 22
Discharge: HOME | End: 2022-04-11
Payer: COMMERCIAL

## 2022-04-11 PROCEDURE — 97014 ELECTRIC STIMULATION THERAPY: CPT

## 2022-04-11 PROCEDURE — 97110 THERAPEUTIC EXERCISES: CPT

## 2022-04-11 NOTE — PROGRESS NOTES
Leona Erp  : 2000  Primary: [employer] [facility] kaycee Keith*  Secondary: [employer] [address] @ [address], [place].  [phone]   [phone]      OUTPATIENT PHYSICAL THERAPY: Daily Treatment Note  2022    ICD-10: Treatment Diagnosis: Pain in right arm (M79.601) and Pain in right shoulder (M25.511)  Effective Dates: 2022 TO 2022 (90 days). Frequency/Duration: 3 times a week for 90 Days  GOALS: (Goals have been discussed and agreed upon with patient.)  Short-Term Functional Goals: Time Frame: 4 weeks MET  1. Patient will be independent with HEP. 2. Patient will demonstrate sufficient healing to discontinue use of sling for daily activity. Discharge Goals: Time Frame: 16 weeks ONGOING  1. Patient will restore mobility of shoulder to symmetric with uninvolved to return to prior level of function. 2. Patient will demonstrate 5/5 strength into ER at 90/90 to normalize dynamic stability required for return to prior level of function. 3. Patient will demonstrate single arm med ball throw with uninvolved to restore power production required for return to prior level of function. _________________________________________________________________________  Pre-treatment Symptoms/Complaints: \"it's no longer feeling sore. \"    Pain: Initial: mild-moderate/10    DOS 2/10/22 s/p bankart with hill-sachs lesion  F/U 3/29 with MD Post Session:  No increase/10   Medications Last Reviewed:  2022  Updated Objective Findings:  Below measures from initial evaluation unless otherwise noted. Observation/Orthostatic Postural Assessment:    Mild winging of inferior angle of right scapula and elevation of right scapula. Palpation:    No specific palpable tenderness.    ROM:    All passive:   Flexion: 90 °   ER: 20 ° in scaption  IR: hand to stomach  Elbow, wrist and hand mobility are WNL  Strength:    is WNL  Further strength testing is not currently indicated. 3/30/22 update:   Scaption: 160 °; 4/5   ER: 70 ° in scaption; 4/5   IR: 60 °; 4+/5      TREATMENT:   THERAPEUTIC ACTIVITY: ( see below for minutes): Therapeutic activities per grid below to improve mobility, strength, balance and coordination. Required minimal visual, verbal, manual and tactile cues to improve independence and safety with daily activities . THERAPEUTIC EXERCISE: (see below for minutes):  Exercises per grid below to improve mobility, strength, balance and coordination. Required minimal verbal and manual cues to promote proper body alignment, promote proper body posture and promote proper body mechanics. Progressed resistance, range, repetitions and complexity of movement as indicated. MANUAL THERAPY: (see below for minutes): Joint mobilization and Soft tissue mobilization was utilized and necessary because of the patient's restricted joint motion, painful spasm, loss of articular motion and restricted motion of soft tissue. MODALITIES: (see below for minutes):      to decrease pain    SELF CARE: (see below for minutes): Procedure(s) (per grid) utilized to improve and/or restore self-care/home management as related to dressing, bathing and grooming. Required minimal verbal cueing to facilitate activities of daily living skills and compensatory activities.      Petros 3/17/22 (visit 6) 3/19/22 (visit 7) 3/21/22 (visit 8) 3/30/22 (visit 9) PN 3/31/22 (visit 10) 4/4/22 (visit 11) 4/6/22 (visit 12) 4/8/22 (visit 13) 4/11/22 (visit 14)     Modalities: 15 min  15 min  15 min  15 min  15 min 15 min  15 min  15 min  15 min      Ice with IFC 15 min  15 min  15 min  15 min  15 min  15 min  15 min  15 min  15 min                                  Therapeutic Exercise: 45 min  45 min  45 min  45 min  40 min 40 min  45 min  45 min  45 min      Stretching repeat continued To R 1 Active warm up: thoracic rotation with hand behind head, back 15x B repeat Therapist assisted stretching into all planes with gentle distraction and mobilization Therapist assisted stretching into all planes; thoracic rotation with hand behind head, back 15x B  Thoracic rotation with hand behind head, back, reach-roll-lift 15x B each repeat     Isometrics    Rhythmic stabilization: 3x1 min in 90 flexion, ER/IR repeat         Elbow ROM              Scapular mobility              Bench press 30x supine with wand, 30x supine flexion with arm; single arm punch 30x  Repeat all 30x bench press; 30x supine flexion with ARM; single arm punch 30x bottoms up KB 5# 3x10 Supine punch 5# 3x15 with bottom up KB   Cane AAROM into flexion Cane AAROM into bench press, flexion 20x Supine punch 10# 3x15 bottom up KB      Rhythmic stabilization         Ball drops from T position [id number]      Abduction SL 3x10 SL 3x10 SL 3x10   SL 2# 3x10 Prone horizontal abduction 3x10 with 5 sec hold; SL abduction2# 3x15       ER  SL 0# 3x10 SL 0# 3x10 SL 1# 3x10 SL 1# 3x15 Red 3x15; IR red 3x15 SL 2# 3x10; IR red 3x15 ER, IR and extension red tband 3x15 to all  ER 2# 3x15; IR green 3x15  ER 2# 3x20, high row to ER red 4x10; IR green 3x20     Scaption SL flexion 3x10 SL flexion 3x10 SL flexion 1# 3x10 Standing 1# x10; 2# 2x10 Standing 2# 3x15 Standing 2# 3x10 Standing 2# 4x10  2# 3x15 2# 3x15     Extension Prone 3x10  Prone 3x10 Prone 1# 3x10 Prone horizontal abduction 3x10 with 5sec hold, prone scaption 3x10 with 5 sec hold Y, T, W 2x10 with 5 sec hold Red tband 3x15 See above Y, T, W 3x10 with 5 sec hold Y, T, W 2# 3x10 with 5 sec hold      Rhythmic stabilization 3x1 min  RS at 90 flexion and neutral ER/IR 3x1 min to all repeat See above          Row    15# x15; 25# 2x15 25# 3x15 25# 3x15  25# 3x15 45# 4x10     Bear hugs         Black 4x10                                                             Proprioceptive Activities:                                                        Manual Therapy: Therapeutic Activities:                                                          HEP: see 2/18/22 flow sheet for specifics. [employer] Portal  Treatment/Session Summary:    · Response to Treatment:  With decreasing symptoms, able to progress ER strengthening to 90 abduction to further challenge dynamic stability of the posterior rotator cuff. · Communication/Consultation:  None today  · Equipment provided today:  None today  · Recommendations/Intent for next treatment session: Next visit will focus on progression of strength and return to prior level of function.     Total Treatment Billable Duration:  60 minutes  PT Patient Time In/Time Out  Time In: 1530  Time Out: [address], DPT    Future Appointments   Date Time Provider Kwasi Hernandez   4/14/2022  2:00 PM Daisy Duenas, DPLILIA [facility]   4/18/2022  3:30 PM [facility]   4/20/2022  8:45 AM Daisy Duenas, CHRISST [facility]   4/22/2022 11:45 AM Daisy Duenas, JHOAN MOBLEY [facility]   4/25/2022  3:30 PM Daisy Duenas, DPT [facility]   4/27/2022  8:45 AM Daisy Duenas, JHOAN MOBLEY [facility]   4/29/2022  3:30 PM Daisy Duenas, JHOAN [facility]

## 2022-04-14 ENCOUNTER — HOSPITAL ENCOUNTER (OUTPATIENT)
Dept: PHYSICAL THERAPY | Age: 22
Discharge: HOME | End: 2022-04-14
Payer: COMMERCIAL

## 2022-04-14 PROCEDURE — 97014 ELECTRIC STIMULATION THERAPY: CPT

## 2022-04-14 PROCEDURE — 97110 THERAPEUTIC EXERCISES: CPT

## 2022-04-14 NOTE — PROGRESS NOTES
Tea Pod  : 2000  Primary: Mesfin Melendez [facility]*  Secondary: [facility] [employer] [address] @ [address]  [phone]   [phone]      OUTPATIENT PHYSICAL THERAPY: Daily Treatment Note  2022    ICD-10: Treatment Diagnosis: Pain in right arm (M79.601) and Pain in right shoulder (M25.511)  Effective Dates: 2022 TO 2022 (90 days). Frequency/Duration: 3 times a week for 90 Days  GOALS: (Goals have been discussed and agreed upon with patient.)  Short-Term Functional Goals: Time Frame: 4 weeks MET  1. Patient will be independent with HEP. 2. Patient will demonstrate sufficient healing to discontinue use of sling for daily activity. Discharge Goals: Time Frame: 16 weeks ONGOING  1. Patient will restore mobility of shoulder to symmetric with uninvolved to return to prior level of function. 2. Patient will demonstrate 5/5 strength into ER at 90/90 to normalize dynamic stability required for return to prior level of function. 3. Patient will demonstrate single arm med ball throw with uninvolved to restore power production required for return to prior level of function. _________________________________________________________________________  Pre-treatment Symptoms/Complaints: Notes minor discomfort at the shoulder, but no muscle soreness. Pain: Initial: mild-moderate/10    DOS 2/10/22 s/p bankart with hill-sachs lesion  F/U 3/29 with MD  9 weeks on 22  12 weeks on 22 Post Session:  No increase/10   Medications Last Reviewed:  2022  Updated Objective Findings:  Below measures from initial evaluation unless otherwise noted. Observation/Orthostatic Postural Assessment:    Mild winging of inferior angle of right scapula and elevation of right scapula. Palpation:    No specific palpable tenderness.    ROM:    All passive:   Flexion: 90 °   ER: 20 ° in scaption  IR: hand to stomach  Elbow, wrist and hand mobility are WNL  Strength:    is WNL  Further strength testing is not currently indicated. 3/30/22 update:   Scaption: 160 °; 4/5   ER: 70 ° in scaption; 4/5   IR: 60 °; 4+/5      TREATMENT:   THERAPEUTIC ACTIVITY: ( see below for minutes): Therapeutic activities per grid below to improve mobility, strength, balance and coordination. Required minimal visual, verbal, manual and tactile cues to improve independence and safety with daily activities . THERAPEUTIC EXERCISE: (see below for minutes):  Exercises per grid below to improve mobility, strength, balance and coordination. Required minimal verbal and manual cues to promote proper body alignment, promote proper body posture and promote proper body mechanics. Progressed resistance, range, repetitions and complexity of movement as indicated. MANUAL THERAPY: (see below for minutes): Joint mobilization and Soft tissue mobilization was utilized and necessary because of the patient's restricted joint motion, painful spasm, loss of articular motion and restricted motion of soft tissue. MODALITIES: (see below for minutes):      to decrease pain    SELF CARE: (see below for minutes): Procedure(s) (per grid) utilized to improve and/or restore self-care/home management as related to dressing, bathing and grooming. Required minimal verbal cueing to facilitate activities of daily living skills and compensatory activities.      Petros 3/17/22 (visit 6) 3/19/22 (visit 7) 3/21/22 (visit 8) 3/30/22 (visit 9) PN 3/31/22 (visit 10) 4/4/22 (visit 11) 4/6/22 (visit 12) 4/8/22 (visit 13) 4/11/22 (visit 14) 4/14/22 (visit 15)     Modalities: 15 min  15 min  15 min  15 min  15 min 15 min  15 min  15 min  15 min  15 min     Ice with IFC 15 min  15 min  15 min  15 min  15 min  15 min  15 min  15 min  15 min  15 min                                 Therapeutic Exercise: 45 min  45 min  45 min  45 min  40 min 40 min  45 min  45 min  45 min  45 min Stretching repeat continued To R 1 Active warm up: thoracic rotation with hand behind head, back 15x B  repeat Therapist assisted stretching into all planes with gentle distraction and mobilization Therapist assisted stretching into all planes; thoracic rotation with hand behind head, back 15x B  Thoracic rotation with hand behind head, back, reach-roll-lift 15x B each repeat Repeat; therapist assisted stretching into ER and IR     Isometrics    Rhythmic stabilization: 3x1 min in 90 flexion, ER/IR repeat         Elbow ROM              Scapular mobility              Bench press 30x supine with wand, 30x supine flexion with arm; single arm punch 30x  Repeat all 30x bench press; 30x supine flexion with ARM; single arm punch 30x bottoms up KB 5# 3x10 Supine punch 5# 3x15 with bottom up [address] into flexion Cane AAROM into bench press, flexion 20x Supine punch 10# 3x15 bottom up KB  [employer] [place] get up 15# 3x6    Rhythmic stabilization         Ball drops from T position [id number]      Abduction SL 3x10 SL 3x10 SL 3x10   SL 2# 3x10 Prone horizontal abduction 3x10 with 5 sec hold; SL abduction2# 3x15       ER  SL 0# 3x10 SL 0# 3x10 SL 1# 3x10 SL 1# 3x15 Red 3x15; IR red 3x15 SL 2# 3x10; IR red 3x15 ER, IR and extension red tband 3x15 to all  ER 2# 3x15; IR green 3x15  ER 2# 3x20, high row to ER red 4x10; IR green 3x20 High row to ER red 3x15; 90/90 IR red 3x15     Scaption SL flexion 3x10 SL flexion 3x10 SL flexion 1# 3x10 Standing 1# x10; 2# 2x10 Standing 2# 3x15 Standing 2# 3x10 Standing 2# 4x10  2# 3x15 2# 3x15 3# x15; 4# 3x10     Extension Prone 3x10  Prone 3x10 Prone 1# 3x10 Prone horizontal abduction 3x10 with 5sec hold, prone scaption 3x10 with 5 sec hold Y, T, W 2x10 with 5 sec hold Red tband 3x15 See above Y, T, W 3x10 with 5 sec hold Y, T, W 2# 3x10 with 5 sec hold Y, T, W 2# 3x10 with 5 sec hold     Rhythmic stabilization 3x1 min  RS at 90 flexion and neutral ER/IR 3x1 min to all repeat See above          Abimael 15# x15; 25# 2x15 25# 3x15 25# 3x15  25# 3x15 45# 4x10 45# 3x15    Bear hugs         Black 4x10 Black 3x15              D2 3x15                                              Proprioceptive Activities:                                                        Manual Therapy:                                          Therapeutic Activities:                                                          HEP: see 2/18/22 flow sheet for specifics. [facility]  Treatment/Session Summary:    · Response to Treatment:  Progressed with volume of strengthening with good tolerance. Pt will be here throughout May and June for school. · Communication/Consultation:  None today  · Equipment provided today:  None today  · Recommendations/Intent for next treatment session: Next visit will focus on progression of strength and return to prior level of function.     Total Treatment Billable Duration:  60 minutes  PT Patient Time In/Time Out  Time In: 1400  Time Out: JHOAN Phelps    Future Appointments   Date Time Provider Kwasi Hernandez   4/14/2022  2:00 PM Madison Deleon, JHOAN [facility]   4/18/2022  3:30 PM JHOAN [facility]   4/20/2022  8:45 AM Madison Deleon, JHONA [facility]   4/22/2022 11:45 AM JHOAN [facility]   4/25/2022  3:30 PM Madison Deleon DPT [facility]   4/27/2022  8:45 AM JHOAN [facility]   4/29/2022  3:30 PM Madison Deleon DPT [facility]

## 2022-04-18 ENCOUNTER — HOSPITAL ENCOUNTER (OUTPATIENT)
Dept: PHYSICAL THERAPY | Age: 22
Discharge: HOME | End: 2022-04-18
Payer: COMMERCIAL

## 2022-04-18 PROCEDURE — 97110 THERAPEUTIC EXERCISES: CPT

## 2022-04-18 PROCEDURE — 97014 ELECTRIC STIMULATION THERAPY: CPT

## 2022-04-18 NOTE — PROGRESS NOTES
Pramod Patel  : 2000  Primary: [facility]  Secondary: [employer] [address] [facility] @ [address], [address] [place] [address]  [phone]   [phone]      OUTPATIENT PHYSICAL THERAPY: Daily Treatment Note  2022    ICD-10: Treatment Diagnosis: Pain in right arm (M79.601) and Pain in right shoulder (M25.511)  Effective Dates: 2022 TO 2022 (90 days). Frequency/Duration: 3 times a week for 90 Days  GOALS: (Goals have been discussed and agreed upon with patient.)  Short-Term Functional Goals: Time Frame: 4 weeks MET  1. Patient will be independent with HEP. 2. Patient will demonstrate sufficient healing to discontinue use of sling for daily activity. Discharge Goals: Time Frame: 16 weeks ONGOING  1. Patient will restore mobility of shoulder to symmetric with uninvolved to return to prior level of function. 2. Patient will demonstrate 5/5 strength into ER at 90/90 to normalize dynamic stability required for return to prior level of function. 3. Patient will demonstrate single arm med ball throw with uninvolved to restore power production required for return to prior level of function. _________________________________________________________________________  Pre-treatment Symptoms/Complaints: \"the shoulder is feeling pretty good. \"  Pain: Initial: mild-moderate/10    DOS 2/10/22 s/p bankart with hill-sachs lesion  F/U 3/29 with MD  9 weeks on 22  12 weeks on 22 Post Session:  No increase/10   Medications Last Reviewed:  2022  Updated Objective Findings:  Below measures from initial evaluation unless otherwise noted. Observation/Orthostatic Postural Assessment:    Mild winging of inferior angle of right scapula and elevation of right scapula. Palpation:    No specific palpable tenderness.    ROM:    All passive:   Flexion: 90 °   ER: 20 ° in scaption  IR: hand to stomach  Elbow, wrist and hand mobility are WNL  Strength:    is WNL  Further strength testing is not currently indicated. 3/30/22 update:   Scaption: 160 °; 4/5   ER: 70 ° in scaption; 4/5   IR: 60 °; 4+/5      TREATMENT:   THERAPEUTIC ACTIVITY: ( see below for minutes): Therapeutic activities per grid below to improve mobility, strength, balance and coordination. Required minimal visual, verbal, manual and tactile cues to improve independence and safety with daily activities . THERAPEUTIC EXERCISE: (see below for minutes):  Exercises per grid below to improve mobility, strength, balance and coordination. Required minimal verbal and manual cues to promote proper body alignment, promote proper body posture and promote proper body mechanics. Progressed resistance, range, repetitions and complexity of movement as indicated. MANUAL THERAPY: (see below for minutes): Joint mobilization and Soft tissue mobilization was utilized and necessary because of the patient's restricted joint motion, painful spasm, loss of articular motion and restricted motion of soft tissue. MODALITIES: (see below for minutes):      to decrease pain    SELF CARE: (see below for minutes): Procedure(s) (per grid) utilized to improve and/or restore self-care/home management as related to dressing, bathing and grooming. Required minimal verbal cueing to facilitate activities of daily living skills and compensatory activities.      Petros 3/17/22 (visit 6) 3/19/22 (visit 7) 3/21/22 (visit 8) 3/30/22 (visit 9) PN 3/31/22 (visit 10) 4/4/22 (visit 11) 4/6/22 (visit 12) 4/8/22 (visit 13) 4/11/22 (visit 14) 4/14/22 (visit 15)  4/18/22 (visit 16)    Modalities: 15 min  15 min  15 min  15 min  15 min 15 min  15 min  15 min  15 min  15 min  15 min    Ice with IFC 15 min  15 min  15 min  15 min  15 min  15 min  15 min  15 min  15 min  15 min  15 min                                Therapeutic Exercise: 45 min  45 min  45 min  45 min  40 min 40 min  45 min  45 min  45 min  45 min 45 min    Stretching repeat continued To R 1 Active warm up: thoracic rotation with hand behind head, back 15x B  repeat Therapist assisted stretching into all planes with gentle distraction and mobilization Therapist assisted stretching into all planes; thoracic rotation with hand behind head, back 15x B  Thoracic rotation with hand behind head, back, reach-roll-lift 15x B each repeat Repeat; therapist assisted stretching into ER and IR     Isometrics    Rhythmic stabilization: 3x1 min in 90 flexion, ER/IR repeat         Elbow ROM              Scapular mobility              Bench press 30x supine with wand, 30x supine flexion with arm; single arm punch 30x  Repeat all 30x bench press; 30x supine flexion with ARM; single arm punch 30x bottoms up KB 5# 3x10 Supine punch 5# 3x15 with bottom up [address] into flexion Cane AAROM into bench press, flexion 20x Supine punch 10# 3x15 bottom up KB  [place] get up 15# 3x6 [place] get ups 15# 4x10   Rhythmic stabilization         Ball drops from T position [id number]      Abduction SL 3x10 SL 3x10 SL 3x10   SL 2# 3x10 Prone horizontal abduction 3x10 with 5 sec hold; SL abduction2# 3x15       ER  SL 0# 3x10 SL 0# 3x10 SL 1# 3x10 SL 1# 3x15 Red 3x15; IR red 3x15 SL 2# 3x10; IR red 3x15 ER, IR and extension red tband 3x15 to all  ER 2# 3x15; IR green 3x15  ER 2# 3x20, high row to ER red 4x10; IR green 3x20 High row to ER red 3x15; 90/90 IR red 3x15  High row to ER to [place] press red 4x10; IR 90/90 green 3x20    Scaption SL flexion 3x10 SL flexion 3x10 SL flexion 1# 3x10 Standing 1# x10; 2# 2x10 Standing 2# 3x15 Standing 2# 3x10 Standing 2# 4x10  2# 3x15 2# 3x15 3# x15; 4# 3x10  4# 3x10    Extension Prone 3x10  Prone 3x10 Prone 1# 3x10 Prone horizontal abduction 3x10 with 5sec hold, prone scaption 3x10 with 5 sec hold Y, T, W 2x10 with 5 sec hold Red tband 3x15 See above Y, T, W 3x10 with 5 sec hold Y, T, W 2# 3x10 with 5 sec hold Y, T, W 2# 3x10 with 5 sec hold Y, T, W 3# 3x10 Rhythmic stabilization 3x1 min  RS at 90 flexion and neutral ER/IR 3x1 min to all repeat See above          Row    15# x15; 25# 2x15 25# 3x15 25# 3x15  25# 3x15 45# 4x10 45# 3x15 60# 4x8    Bear hugs         Black 4x10 Black 3x15 Black 3x15             D2 3x15                                              Proprioceptive Activities:                                                        Manual Therapy:                                          Therapeutic Activities:                                                          HEP: see 2/18/22 flow sheet for specifics. [employer] [facility]  Treatment/Session Summary:    · Response to Treatment:  Progressed with ER to include OH press to improve strength in positions of elevation. · Communication/Consultation:  None today  · Equipment provided today:  None today  · Recommendations/Intent for next treatment session: Next visit will focus on progression of strength and return to prior level of function.     Total Treatment Billable Duration:  60 minutes  PT Patient Time In/Time Out  Time In: 3174  Time Out: [address] JHOAN Beltran    Future Appointments   Date Time Provider Kwasi Hernandez   4/20/2022  8:45 AM Lesly Shirts, DPT [facility]   4/22/2022 11:45 AM Lesly Shirts, DPT [facility]   4/25/2022  3:30 PM Lesly Shirts, DPT [facility]   4/27/2022  8:45 AM Lesly Shirts, DPT [facility]   4/29/2022  3:30 PM Lesly Shirts, DPT [facility]

## 2022-04-20 ENCOUNTER — HOSPITAL ENCOUNTER (OUTPATIENT)
Dept: PHYSICAL THERAPY | Age: 22
Discharge: HOME | End: 2022-04-20
Payer: COMMERCIAL

## 2022-04-20 NOTE — PROGRESS NOTES
Patient cancelled today's appointment due to out of town travel. Will follow up at scheduled appointment next week.

## 2022-04-22 ENCOUNTER — HOSPITAL ENCOUNTER (OUTPATIENT)
Dept: PHYSICAL THERAPY | Age: 22
Discharge: HOME | End: 2022-04-22
Payer: COMMERCIAL

## 2022-04-25 ENCOUNTER — HOSPITAL ENCOUNTER (OUTPATIENT)
Dept: PHYSICAL THERAPY | Age: 22
Discharge: HOME | End: 2022-04-25
Payer: COMMERCIAL

## 2022-04-25 PROCEDURE — 97014 ELECTRIC STIMULATION THERAPY: CPT

## 2022-04-25 PROCEDURE — 97110 THERAPEUTIC EXERCISES: CPT

## 2022-04-25 NOTE — PROGRESS NOTES
Pramod Patel  : 2000  Primary: [facility]ad [facility]*  Secondary: [employer] [address] @ [address]  [address] [place] [address].  [phone]   [phone]      OUTPATIENT PHYSICAL THERAPY: Daily Treatment Note  2022    ICD-10: Treatment Diagnosis: Pain in right arm (M79.601) and Pain in right shoulder (M25.511)  Effective Dates: 2022 TO 2022 (90 days). Frequency/Duration: 3 times a week for 90 Days  GOALS: (Goals have been discussed and agreed upon with patient.)  Short-Term Functional Goals: Time Frame: 4 weeks MET  1. Patient will be independent with HEP. 2. Patient will demonstrate sufficient healing to discontinue use of sling for daily activity. Discharge Goals: Time Frame: 16 weeks ONGOING  1. Patient will restore mobility of shoulder to symmetric with uninvolved to return to prior level of function. 2. Patient will demonstrate 5/5 strength into ER at 90/90 to normalize dynamic stability required for return to prior level of function. 3. Patient will demonstrate single arm med ball throw with uninvolved to restore power production required for return to prior level of function. _________________________________________________________________________  Pre-treatment Symptoms/Complaints: Notes the shoulder has been feeling a lot better. Still gets some intermittent sharp pain, but this is rare. Pain: Initial: mild-moderate/10    DOS 2/10/22 s/p bankart with hill-sachs lesion  F/U 3/29 with MD  9 weeks on 22  12 weeks on 22 Post Session:  No increase/10   Medications Last Reviewed:  2022  Updated Objective Findings:  Below measures from initial evaluation unless otherwise noted. Observation/Orthostatic Postural Assessment:    Mild winging of inferior angle of right scapula and elevation of right scapula. Palpation:    No specific palpable tenderness.    ROM:    All passive:   Flexion: 90 °   ER: 20 ° in scaption  IR: hand to stomach  Elbow, wrist and hand mobility are WNL  Strength:    is WNL  Further strength testing is not currently indicated. 3/30/22 update:   Scaption: 160 °; 4/5   ER: 70 ° in scaption; 4/5   IR: 60 °; 4+/5      TREATMENT:   THERAPEUTIC ACTIVITY: ( see below for minutes): Therapeutic activities per grid below to improve mobility, strength, balance and coordination. Required minimal visual, verbal, manual and tactile cues to improve independence and safety with daily activities . THERAPEUTIC EXERCISE: (see below for minutes):  Exercises per grid below to improve mobility, strength, balance and coordination. Required minimal verbal and manual cues to promote proper body alignment, promote proper body posture and promote proper body mechanics. Progressed resistance, range, repetitions and complexity of movement as indicated. MANUAL THERAPY: (see below for minutes): Joint mobilization and Soft tissue mobilization was utilized and necessary because of the patient's restricted joint motion, painful spasm, loss of articular motion and restricted motion of soft tissue. MODALITIES: (see below for minutes):      to decrease pain    SELF CARE: (see below for minutes): Procedure(s) (per grid) utilized to improve and/or restore self-care/home management as related to dressing, bathing and grooming. Required minimal verbal cueing to facilitate activities of daily living skills and compensatory activities.      Petros 3/30/22 (visit 9) PN 3/31/22 (visit 10) 4/4/22 (visit 11) 4/6/22 (visit 12) 4/8/22 (visit 13) 4/11/22 (visit 14) 4/14/22 (visit 15)  4/18/22 (visit 16)  4/25/22 (visit 17)     Modalities: 15 min  15 min 15 min  15 min  15 min  15 min  15 min  15 min  15 min      Ice with IFC 15 min  15 min  15 min  15 min  15 min  15 min  15 min  15 min  15 min                                  Therapeutic Exercise: 45 min  40 min 40 min  45 min  45 min  45 min  45 min  45 min  45 min Stretching Active warm up: thoracic rotation with hand behind head, back 15x B  repeat Therapist assisted stretching into all planes with gentle distraction and mobilization Therapist assisted stretching into all planes; thoracic rotation with hand behind head, back 15x B  Thoracic rotation with hand behind head, back, reach-roll-lift 15x B each repeat Repeat; therapist assisted stretching into ER and IR   Thoracic rotation: hand behind head, back, reach-roll-lift 15x to all; CKC push up stabilization [id number], hand taps from toes [id number]     Isometrics Rhythmic stabilization: 3x1 min in 90 flexion, ER/IR repeat            Elbow ROM              Scapular mobility              Bench press Supine punch 5# 3x15 with bottom up [address] into flexion Cane AAROM into bench press, flexion 20x Supine punch 10# 3x15 bottom up KB  [place] get up 15# 3x6 [place] get ups 15# 4x10      Rhythmic stabilization      Ball drops from T position [id number]    Body blade 90/90 [id number], D2 in half kneeling 3x5     Abduction   SL 2# 3x10 Prone horizontal abduction 3x10 with 5 sec hold; SL abduction2# 3x15          ER  SL 1# 3x15 Red 3x15; IR red 3x15 SL 2# 3x10; IR red 3x15 ER, IR and extension red tband 3x15 to all  ER 2# 3x15; IR green 3x15  ER 2# 3x20, high row to ER red 4x10; IR green 3x20 High row to ER red 3x15; 90/90 IR red 3x15  High row to ER to [facility] press red 4x10; IR 90/90 green 3x20  High row to ER to [facility] press red 4x10; 90/90 green 3x20     Scaption Standing 1# x10; 2# 2x10 Standing 2# 3x15 Standing 2# 3x10 Standing 2# 4x10  2# 3x15 2# 3x15 3# x15; 4# 3x10  4# 3x10       Extension Prone horizontal abduction 3x10 with 5sec hold, prone scaption 3x10 with 5 sec hold Y, T, W 2x10 with 5 sec hold Red tband 3x15 See above Y, T, W 3x10 with 5 sec hold Y, T, W 2# 3x10 with 5 sec hold Y, T, W 2# 3x10 with 5 sec hold Y, T, W 3# 3x10        See above             Row 15# x15; 25# 2x15 25# 3x15 25# 3x15  25# 3x15 45# 4x10 45# 3x15 60# 4x8  Inverted row 4x5; face pulls 3x15     Bear hugs      Black 4x10 Black 3x15 Black 3x15 Black 3x15            D2 3x15                                                 Proprioceptive Activities:                                                        Manual Therapy:                                          Therapeutic Activities:                                                          HEP: see 2/18/22 flow sheet for specifics. [facility] Portal  Treatment/Session Summary:    · Response to Treatment:  Progressed to closed chain stabilization activities with good scapular stability. · Communication/Consultation:  None today  · Equipment provided today:  None today  · Recommendations/Intent for next treatment session: Next visit will focus on progression of strength and return to prior level of function.     Total Treatment Billable Duration:  60 minutes  PT Patient Time In/Time Out  Time In: 1530  Time Out: [address], DPT    Future Appointments   Date Time Provider Kwasi Hernandez   4/27/2022  8:45 AM Conrado Apa, DPT [facility]   4/29/2022  3:30 PM Conrado Apa, DPT [facility]   5/2/2022  3:30 PM Conrado Apa, DPT [facility]   5/4/2022  8:45 AM Conrado Apa, DPT [facility]   5/6/2022  8:45 AM Conrado Apa, DPT [facility]   5/9/2022 11:45 AM Conrado Apa, DPT [facility]   5/11/2022  8:00 AM Conrado Apa, DPT [facility]   5/12/2022 11:45 AM Conrado Apa, DPT [facility]   5/16/2022  3:30 PM Conrado Apa, DPT [facility]   5/18/2022 11:45 AM Conrado Apa, DPT [facility]   5/20/2022  8:45 AM Conrado Apa, DPT [facility]   5/23/2022 11:45 AM Conrado Apa, DPT [facility]   5/25/2022  8:45 AM Conrado Apa, DPT [facility]   5/27/2022  8:45 AM [facility]Care Instructions: I reviewed with the patient in detail post-care instructions. Patient is to wear sunprotection, and avoid picking at any of the treated lesions. Pt may apply Vaseline to crusted or scabbing areas. Detail Level: Detailed Render Note In Bullet Format When Appropriate: No Consent: The patient's consent was obtained including but not limited to risks of crusting, scabbing, blistering, scarring, darker or lighter pigmentary change, recurrence, incomplete removal and infection. Render Post-Care Instructions In Note?: yes Duration Of Freeze Thaw-Cycle (Seconds): 0 Number Of Freeze-Thaw Cycles: 2 freeze-thaw cycles

## 2022-04-27 ENCOUNTER — HOSPITAL ENCOUNTER (OUTPATIENT)
Dept: PHYSICAL THERAPY | Age: 22
Discharge: HOME | End: 2022-04-27
Payer: COMMERCIAL

## 2022-04-27 PROCEDURE — 97110 THERAPEUTIC EXERCISES: CPT

## 2022-04-27 PROCEDURE — 97014 ELECTRIC STIMULATION THERAPY: CPT

## 2022-04-27 NOTE — PROGRESS NOTES
Lin Tellez  : 2000  Primary: Chelsea Osorio Of [facility]*  Secondary: [employer] [id number] @ [address]  [address] [place] [address].  [phone]   [phone]      OUTPATIENT PHYSICAL THERAPY: Daily Treatment Note  2022    ICD-10: Treatment Diagnosis: Pain in right arm (M79.601) and Pain in right shoulder (M25.511)  Effective Dates: 2022 TO 2022 (90 days). Frequency/Duration: 3 times a week for 90 Days  GOALS: (Goals have been discussed and agreed upon with patient.)  Short-Term Functional Goals: Time Frame: 4 weeks MET  1. Patient will be independent with HEP. 2. Patient will demonstrate sufficient healing to discontinue use of sling for daily activity. Discharge Goals: Time Frame: 16 weeks ONGOING  1. Patient will restore mobility of shoulder to symmetric with uninvolved to return to prior level of function. 2. Patient will demonstrate 5/5 strength into ER at 90/90 to normalize dynamic stability required for return to prior level of function. 3. Patient will demonstrate single arm med ball throw with uninvolved to restore power production required for return to prior level of function. _________________________________________________________________________  Pre-treatment Symptoms/Complaints: \"it felt good after Monday. It's been a little sore because I slept on it the other night. \"    Pain: Initial: mild-moderate/10    DOS 2/10/22 s/p bankart with hill-sachs lesion  F/U 3/29 with MD  9 weeks on 22  12 weeks on 22 Post Session:  No increase/10   Medications Last Reviewed:  2022  Updated Objective Findings:  Below measures from initial evaluation unless otherwise noted. Observation/Orthostatic Postural Assessment:    Mild winging of inferior angle of right scapula and elevation of right scapula. Palpation:    No specific palpable tenderness.    ROM:    All passive:   Flexion: 90 °   ER: 20 ° in scaption  IR: hand to stomach  Elbow, wrist and hand mobility are WNL  Strength:    is WNL  Further strength testing is not currently indicated. 3/30/22 update:   Scaption: 160 °; 4/5   ER: 70 ° in scaption; 4/5   IR: 60 °; 4+/5      TREATMENT:   THERAPEUTIC ACTIVITY: ( see below for minutes): Therapeutic activities per grid below to improve mobility, strength, balance and coordination. Required minimal visual, verbal, manual and tactile cues to improve independence and safety with daily activities . THERAPEUTIC EXERCISE: (see below for minutes):  Exercises per grid below to improve mobility, strength, balance and coordination. Required minimal verbal and manual cues to promote proper body alignment, promote proper body posture and promote proper body mechanics. Progressed resistance, range, repetitions and complexity of movement as indicated. MANUAL THERAPY: (see below for minutes): Joint mobilization and Soft tissue mobilization was utilized and necessary because of the patient's restricted joint motion, painful spasm, loss of articular motion and restricted motion of soft tissue. MODALITIES: (see below for minutes):      to decrease pain    SELF CARE: (see below for minutes): Procedure(s) (per grid) utilized to improve and/or restore self-care/home management as related to dressing, bathing and grooming. Required minimal verbal cueing to facilitate activities of daily living skills and compensatory activities.      Petros 3/30/22 (visit 9) PN 3/31/22 (visit 10) 4/4/22 (visit 11) 4/6/22 (visit 12) 4/8/22 (visit 13) 4/11/22 (visit 14) 4/14/22 (visit 15)  4/18/22 (visit 16)  4/25/22 (visit 17) 4/27/22 (visit 18)    Modalities: 15 min  15 min 15 min  15 min  15 min  15 min  15 min  15 min  15 min  15 min     Ice with IFC 15 min  15 min  15 min  15 min  15 min  15 min  15 min  15 min  15 min  15 min                                 Therapeutic Exercise: 45 min  40 min 40 min  45 min  45 min  45 min  45 min 45 min  45 min  45 min     Stretching Active warm up: thoracic rotation with hand behind head, back 15x B  repeat Therapist assisted stretching into all planes with gentle distraction and mobilization Therapist assisted stretching into all planes; thoracic rotation with hand behind head, back 15x B  Thoracic rotation with hand behind head, back, reach-roll-lift 15x B each repeat Repeat; therapist assisted stretching into ER and IR   Thoracic rotation: hand behind head, back, reach-roll-lift 15x to all; CKC push up stabilization [id number], hand taps from toes [id number] Repeat as before    Isometrics Rhythmic stabilization: 3x1 min in 90 flexion, ER/IR repeat            Elbow ROM              Scapular mobility              Bench press Supine punch 5# 3x15 with bottom up KB   Cane AAROM into flexion Cane AAROM into bench press, flexion 20x Supine punch 10# 3x15 bottom up KB  [place] get up 15# 3x6 [place] get ups 15# 4x10      Rhythmic stabilization      Ball drops from T position [id number]    Body blade 90/90 [id number], D2 in half kneeling 3x5 repeat    Abduction   SL 2# 3x10 Prone horizontal abduction 3x10 with 5 sec hold; SL abduction2# 3x15          ER  SL 1# 3x15 Red 3x15; IR red 3x15 SL 2# 3x10; IR red 3x15 ER, IR and extension red tband 3x15 to all  ER 2# 3x15; IR green 3x15  ER 2# 3x20, high row to ER red 4x10; IR green 3x20 High row to ER red 3x15; 90/90 IR red 3x15  High row to ER to [facility] press red 4x10; IR 90/90 green 3x20  High row to ER to [facility] press red 4x10; 90/90 green 3x20 90/90 green IR 3x20     Scaption Standing 1# x10; 2# 2x10 Standing 2# 3x15 Standing 2# 3x10 Standing 2# 4x10  2# 3x15 2# 3x15 3# x15; 4# 3x10  4# 3x10   4# 4x10     Extension Prone horizontal abduction 3x10 with 5sec hold, prone scaption 3x10 with 5 sec hold Y, T, W 2x10 with 5 sec hold Red tband 3x15 See above Y, T, W 3x10 with 5 sec hold Y, T, W 2# 3x10 with 5 sec hold Y, T, W 2# 3x10 with 5 sec hold Y, T, W 3# 3x10        See above Row 15# x15; 25# 2x15 25# 3x15 25# 3x15  25# 3x15 45# 4x10 45# 3x15 60# 4x8  Inverted row 4x5; face pulls 3x15 Inverted row 4x6; face pulls black 3x15     Bear hugs      Black 4x10 Black 3x15 Black 3x15 Black 3x15 Black 3x15            D2 3x15   D2 flexion green 3x15 in 1/2 kneel                                              Proprioceptive Activities:                                                        Manual Therapy:                                          Therapeutic Activities:                                                          HEP: see 2/18/22 flow sheet for specifics. [facility] Portal  Treatment/Session Summary:    · Response to Treatment:  Demonstrates improved control and stability with body blade in overhead positions. · Communication/Consultation:  None today  · Equipment provided today:  None today  · Recommendations/Intent for next treatment session: Next visit will focus on progression of strength and return to prior level of function.     Total Treatment Billable Duration:  60 minutes  PT Patient Time In/Time Out  Time In: 0845  Time Out: Na Pinky 1120, DPT    Future Appointments   Date Time Provider Kwasi Hernandez   4/29/2022  3:30 PM Mayme Peeks, DPT [facility]   5/2/2022  3:30 PM [facility]   5/4/2022  8:45 AM [facility]   5/6/2022  8:45 AM Mayme Peeks, DPT [facility]   5/9/2022 11:45 AM Mayme Peeks, DPT [facility]   5/11/2022  8:00 AM Mayme Peeks, DPT [facility]   5/12/2022 11:45 AM Mayme Peeks, DPT [facility]   5/16/2022  3:30 PM Mayme Peeks, DPT [facility]   5/18/2022 11:45 AM Mayme Peeks, DPT [facility]   5/20/2022  8:45 AM Mayme Peeks, DPT [facility]   5/23/2022 11:45 AM Mayme Peeks, DPT [facility]   5/25/2022  8[facility]   5/27/2022  8:45 AM Kym Starks DPT [facility]

## 2022-04-29 ENCOUNTER — HOSPITAL ENCOUNTER (OUTPATIENT)
Dept: PHYSICAL THERAPY | Age: 22
Discharge: HOME | End: 2022-04-29
Payer: COMMERCIAL

## 2022-04-29 PROCEDURE — 97014 ELECTRIC STIMULATION THERAPY: CPT

## 2022-04-29 PROCEDURE — 97110 THERAPEUTIC EXERCISES: CPT

## 2022-04-29 NOTE — PROGRESS NOTES
Wes Hidalgo  : 2000  Primary: Gerald Glover [facility]*  Secondary: [employer] [address] @ [address]  [address] [place] [address].  [phone]   [phone]      OUTPATIENT PHYSICAL THERAPY: Daily Treatment Note  2022    ICD-10: Treatment Diagnosis: Pain in right arm (M79.601) and Pain in right shoulder (M25.511)  Effective Dates: 2022 TO 2022 (90 days). Frequency/Duration: 3 times a week for 90 Days  GOALS: (Goals have been discussed and agreed upon with patient.)  Short-Term Functional Goals: Time Frame: 4 weeks MET  1. Patient will be independent with HEP. 2. Patient will demonstrate sufficient healing to discontinue use of sling for daily activity. Discharge Goals: Time Frame: 16 weeks ONGOING  1. Patient will restore mobility of shoulder to symmetric with uninvolved to return to prior level of function. 2. Patient will demonstrate 5/5 strength into ER at 90/90 to normalize dynamic stability required for return to prior level of function. 3. Patient will demonstrate single arm med ball throw with uninvolved to restore power production required for return to prior level of function. _________________________________________________________________________  Pre-treatment Symptoms/Complaints: \"Notes the shoulder soreness has decreased. Pain: Initial: mild-moderate/10    DOS 2/10/22 s/p bankart with hill-sachs lesion  F/U 3/29 with MD  9 weeks on 22  12 weeks on 22 Post Session:  No increase/10   Medications Last Reviewed:  2022  Updated Objective Findings:  Below measures from initial evaluation unless otherwise noted. Observation/Orthostatic Postural Assessment:    Mild winging of inferior angle of right scapula and elevation of right scapula. Palpation:    No specific palpable tenderness.    ROM:    All passive:   Flexion: 90 °   ER: 20 ° in scaption  IR: hand to stomach  Elbow, wrist and hand mobility are WNL  Strength:    is WNL  Further strength testing is not currently indicated. 3/30/22 update:   Scaption: 160 °; 4/5   ER: 70 ° in scaption; 4/5   IR: 60 °; 4+/5      TREATMENT:   THERAPEUTIC ACTIVITY: ( see below for minutes): Therapeutic activities per grid below to improve mobility, strength, balance and coordination. Required minimal visual, verbal, manual and tactile cues to improve independence and safety with daily activities . THERAPEUTIC EXERCISE: (see below for minutes):  Exercises per grid below to improve mobility, strength, balance and coordination. Required minimal verbal and manual cues to promote proper body alignment, promote proper body posture and promote proper body mechanics. Progressed resistance, range, repetitions and complexity of movement as indicated. MANUAL THERAPY: (see below for minutes): Joint mobilization and Soft tissue mobilization was utilized and necessary because of the patient's restricted joint motion, painful spasm, loss of articular motion and restricted motion of soft tissue. MODALITIES: (see below for minutes):      to decrease pain    SELF CARE: (see below for minutes): Procedure(s) (per grid) utilized to improve and/or restore self-care/home management as related to dressing, bathing and grooming. Required minimal verbal cueing to facilitate activities of daily living skills and compensatory activities.      Petros 4/8/22 (visit 13) 4/11/22 (visit 14) 4/14/22 (visit 15)  4/18/22 (visit 16)  4/25/22 (visit 17) 4/27/22 (visit 18) 4/29/22 (visit 19)    Modalities: 15 min  15 min  15 min  15 min  15 min  15 min  15 min     Ice with IFC 15 min  15 min  15 min  15 min  15 min  15 min  15 min                           Therapeutic Exercise: 45 min  45 min  45 min  45 min  45 min  45 min  45 min     Stretching Thoracic rotation with hand behind head, back, reach-roll-lift 15x B each repeat Repeat; therapist assisted stretching into ER and IR Thoracic rotation: hand behind head, back, reach-roll-lift 15x to all; CKC push up stabilization [id number], hand taps from toes [id number] Repeat as before Repeat all as before    Isometrics           Elbow ROM           Scapular mobility       Y, T, W 3# 2x10    Bench press Supine punch 10# 3x15 bottom up KB  [place] get up 15# 3x6 [place] get ups 15# 4x10       Rhythmic stabilization  Ball drops from T position [id number]    Body blade 90/90 [id number], D2 in half kneeling 3x5 repeat repeat    Abduction           ER  ER 2# 3x15; IR green 3x15  ER 2# 3x20, high row to ER red 4x10; IR green 3x20 High row to ER red 3x15; 90/90 IR red 3x15  High row to ER to [facility] press red 4x10; IR 90/90 green 3x20  High row to ER to [facility] press red 4x10; 90/90 green 3x20 90/90 green IR 3x20  90/90 green IR 3x20    Scaption 2# 3x15 2# 3x15 3# x15; 4# 3x10  4# 3x10   4# 4x10  4# 4x10    Extension Y, T, W 3x10 with 5 sec hold Y, T, W 2# 3x10 with 5 sec hold Y, T, W 2# 3x10 with 5 sec hold Y, T, W 3# 3x10                   Row 25# 3x15 45# 4x10 45# 3x15 60# 4x8  Inverted row 4x5; face pulls 3x15 Inverted row 4x6; face pulls black 3x15  Inverted row 4x6; face pulls 3x15 black    Bear hugs  Black 4x10 Black 3x15 Black 3x15 Black 3x15 Black 3x15  Black 3x15       D2 3x15   D2 flexion green 3x15 in 1/2 kneel repeat                                     Proprioceptive Activities:                                            Manual Therapy:                                 Therapeutic Activities:                                              HEP: see 2/18/22 flow sheet for specifics. [facility] Portal  Treatment/Session Summary:    · Response to Treatment:  Demonstrates sufficient healing to progress to push ups and movement through closed chain positions to further challenge shoulder stability.      · Communication/Consultation:  None today  · Equipment provided today:  None today  · Recommendations/Intent for next treatment session: Next visit will focus on progression of strength and return to prior level of function.     Total Treatment Billable Duration:  60 minutes  PT Patient Time In/Time Out  Time In: 1530  Time Out: 404 SOWMYA Beltran, DPT    Future Appointments   Date Time Provider Kwasi Hernandez   5/2/2022  3:30 PM Ivory Been, DPT [facility]   5/4/2022  8:45 AM Ivory Been, DPT [facility]   5/6/2022  8:45 AM Ivory Been, DPT [facility]   5/9/2022 11:45 AM Ivory Been, DPT [facility]   5/20/2022  8:45 AM Ivory Been, DPT [facility]   5/23/2022 11:45 AM Ivory Been, DPT [facility]   5/25/2022  8:45 AM Ivory Been, DPT [facility]   5/27/2022  8:45 AM Ivory Been, DPT [facility]

## 2022-05-02 ENCOUNTER — HOSPITAL ENCOUNTER (OUTPATIENT)
Dept: PHYSICAL THERAPY | Age: 22
Discharge: HOME | End: 2022-05-02
Payer: COMMERCIAL

## 2022-05-02 PROCEDURE — 97014 ELECTRIC STIMULATION THERAPY: CPT

## 2022-05-02 PROCEDURE — 97110 THERAPEUTIC EXERCISES: CPT

## 2022-05-02 NOTE — PROGRESS NOTES
Fani Jennings  : 2000  Primary: Jean Carlos Christianson [facility]*  Secondary: [employer] Stacie Carlisle @ [address]  [phone]   [phone]      OUTPATIENT PHYSICAL THERAPY: Daily Treatment Note and Discharge  2022    ICD-10: Treatment Diagnosis: Pain in right arm (M79.601) and Pain in right shoulder (M25.511)  Effective Dates: 2022 TO 2022 (90 days). Frequency/Duration: 3 times a week for 90 Days  GOALS: (Goals have been discussed and agreed upon with patient.)  Short-Term Functional Goals: Time Frame: 4 weeks MET  1. Patient will be independent with HEP. 2. Patient will demonstrate sufficient healing to discontinue use of sling for daily activity. Discharge Goals: Time Frame: 16 weeks ONGOING  1. Patient will restore mobility of shoulder to symmetric with uninvolved to return to prior level of function. 2. Patient will demonstrate 5/5 strength into ER at 90/90 to normalize dynamic stability required for return to prior level of function. 3. Patient will demonstrate single arm med ball throw with uninvolved to restore power production required for return to prior level of function. _________________________________________________________________________  Pre-treatment Symptoms/Complaints: No new complaints. Pain: Initial: mild-moderate/10    DOS 2/10/22 s/p bankart with hill-sachs lesion  F/U 3/29 with MD  9 weeks on 22  12 weeks on 22 Post Session:  No increase/10   Medications Last Reviewed:  2022  Updated Objective Findings:  Below measures from initial evaluation unless otherwise noted. Observation/Orthostatic Postural Assessment:    Mild winging of inferior angle of right scapula and elevation of right scapula. Palpation:    No specific palpable tenderness.    ROM:    All passive:   Flexion: 90 °   ER: 20 ° in scaption  IR: hand to stomach  Elbow, wrist and hand mobility are WNL  Strength:    is WNL  Further strength testing is not currently indicated. 3/30/22 update:   Scaption: 160 °; 4/5   ER: 70 ° in scaption; 4/5   IR: 60 °; 4+/5     5/2/22:   Shoulder flexion: 180 °; 5/5   Shoulder ER: 90 ° at 90/90; 4+/5 at 90 abduction; 5/5 at 0 abduction  Shoulder IR: 90 ° at 90/90; 5/5 at 90 and 0 abduction     TREATMENT:   THERAPEUTIC ACTIVITY: ( see below for minutes): Therapeutic activities per grid below to improve mobility, strength, balance and coordination. Required minimal visual, verbal, manual and tactile cues to improve independence and safety with daily activities . THERAPEUTIC EXERCISE: (see below for minutes):  Exercises per grid below to improve mobility, strength, balance and coordination. Required minimal verbal and manual cues to promote proper body alignment, promote proper body posture and promote proper body mechanics. Progressed resistance, range, repetitions and complexity of movement as indicated. MANUAL THERAPY: (see below for minutes): Joint mobilization and Soft tissue mobilization was utilized and necessary because of the patient's restricted joint motion, painful spasm, loss of articular motion and restricted motion of soft tissue. MODALITIES: (see below for minutes):      to decrease pain    SELF CARE: (see below for minutes): Procedure(s) (per grid) utilized to improve and/or restore self-care/home management as related to dressing, bathing and grooming. Required minimal verbal cueing to facilitate activities of daily living skills and compensatory activities.      Petros 4/8/22 (visit 13) 4/11/22 (visit 14) 4/14/22 (visit 15)  4/18/22 (visit 16)  4/25/22 (visit 17) 4/27/22 (visit 18) 4/29/22 (visit 19) 5/2/22 (visit 20)   Modalities: 15 min  15 min  15 min  15 min  15 min  15 min  15 min  15 min   Ice with IFC 15 min  15 min  15 min  15 min  15 min  15 min  15 min  15 min                          Therapeutic Exercise: 45 min  45 min  45 min  45 min  45 min  45 min  45 min  45 min    Stretching Thoracic rotation with hand behind head, back, reach-roll-lift 15x B each repeat Repeat; therapist assisted stretching into ER and IR   Thoracic rotation: hand behind head, back, reach-roll-lift 15x to all; CKC push up stabilization [id number], hand taps from toes [id number] Repeat as before Repeat all as before Repeat all as before   Isometrics           Elbow ROM           Scapular mobility       Y, T, W 3# 2x10 Y, T, W 3# 3x10    Bench press Supine punch 10# 3x15 bottom up KB  [place] get up 15# 3x6 [place] get ups 15# 4x10       Rhythmic stabilization  Ball drops from T position [id number]    Body blade 90/90 [id number], D2 in half kneeling 3x5 repeat repeat repeat   Abduction           ER  ER 2# 3x15; IR green 3x15  ER 2# 3x20, high row to ER red 4x10; IR green 3x20 High row to ER red 3x15; 90/90 IR red 3x15  High row to ER to [facility] press red 4x10; IR 90/90 green 3x20  High row to ER to [facility] press red 4x10; 90/90 green 3x20 90/90 green IR 3x20  90/90 green IR 3x20 90/90 green IR 3x20   Scaption 2# 3x15 2# 3x15 3# x15; 4# 3x10  4# 3x10   4# 4x10  4# 4x10 4# 4x10   Extension Y, T, W 3x10 with 5 sec hold Y, T, W 2# 3x10 with 5 sec hold Y, T, W 2# 3x10 with 5 sec hold Y, T, W 3# 3x10                   Row 25# 3x15 45# 4x10 45# 3x15 60# 4x8  Inverted row 4x5; face pulls 3x15 Inverted row 4x6; face pulls black 3x15  Inverted row 4x6; face pulls 3x15 black Face pulls 3x15 black   Bear hugs  Black 4x10 Black 3x15 Black 3x15 Black 3x15 Black 3x15  Black 3x15 Black 3x15      D2 3x15   D2 flexion green 3x15 in 1/2 kneel repeat Body blade in 90/90 [id number]; D2 body blade 3x5                                    Proprioceptive Activities:                                            Manual Therapy:                                 Therapeutic Activities:                                              HEP: see 2/18/22 flow sheet for specifics.     [facility]  Treatment/Session Summary: · Response to Treatment:  Patient demonstrates sufficient healing to transition to functional sport progression. Will d/c formal therapy at this time to transition to functional sport progression. · Communication/Consultation:  None today  · Equipment provided today:  None today  · Recommendations/Intent for next treatment session: d/c to functional sport progression.      Total Treatment Billable Duration:  60 minutes  PT Patient Time In/Time Out  Time In: 6009  Time Out: [address], DPT    Future Appointments   Date Time Provider Kwasi Hernandez   5/4/2022  8:45 AM Harinder Mendez, JHOAN MOBLEY [facility] [place]   5/6/2022  8:45 AM Harinder Mendez, JHOAN MOBLEY [facility] [place]   5/9/2022 11:45 AM Harinder Mendez, JHOAN MOBLEY [facility] [place]   5/20/2022  8:45 AM Harinder Mendez, CHRISST [facility]   5/23/2022 11:45 AM Harinder Mendez, CHRISST [facility]   5/25/2022  8:45 AM Harinder Mendez, CHRISST [facility]   5/27/2022  8:45 AM Harinder Mendez, CHRISST [facility]

## 2022-05-04 ENCOUNTER — APPOINTMENT (OUTPATIENT)
Dept: PHYSICAL THERAPY | Age: 22
End: 2022-05-04
Payer: COMMERCIAL

## 2022-05-05 NOTE — PROGRESS NOTES
I am accessing Mr. Jean Paul Alvarez chart as a part of our department's internal chart auditing process. I certify that Mr. Kay Tompkins is, or was, a patient in our department.   Thank you,  Fidelia Collier, PT  5/5/2022

## 2022-05-06 ENCOUNTER — APPOINTMENT (OUTPATIENT)
Dept: PHYSICAL THERAPY | Age: 22
End: 2022-05-06
Payer: COMMERCIAL

## 2022-05-09 ENCOUNTER — APPOINTMENT (OUTPATIENT)
Dept: PHYSICAL THERAPY | Age: 22
End: 2022-05-09
Payer: COMMERCIAL

## 2022-05-11 ENCOUNTER — APPOINTMENT (OUTPATIENT)
Dept: PHYSICAL THERAPY | Age: 22
End: 2022-05-11
Payer: COMMERCIAL

## 2022-05-12 ENCOUNTER — APPOINTMENT (OUTPATIENT)
Dept: PHYSICAL THERAPY | Age: 22
End: 2022-05-12
Payer: COMMERCIAL

## 2022-05-16 ENCOUNTER — APPOINTMENT (OUTPATIENT)
Dept: PHYSICAL THERAPY | Age: 22
End: 2022-05-16
Payer: COMMERCIAL

## 2022-05-18 ENCOUNTER — APPOINTMENT (OUTPATIENT)
Dept: PHYSICAL THERAPY | Age: 22
End: 2022-05-18
Payer: COMMERCIAL

## 2022-05-20 ENCOUNTER — APPOINTMENT (OUTPATIENT)
Dept: PHYSICAL THERAPY | Age: 22
End: 2022-05-20
Payer: COMMERCIAL

## 2022-05-23 ENCOUNTER — APPOINTMENT (OUTPATIENT)
Dept: PHYSICAL THERAPY | Age: 22
End: 2022-05-23
Payer: COMMERCIAL

## 2022-05-25 ENCOUNTER — APPOINTMENT (OUTPATIENT)
Dept: PHYSICAL THERAPY | Age: 22
End: 2022-05-25
Payer: COMMERCIAL

## 2022-05-27 ENCOUNTER — APPOINTMENT (OUTPATIENT)
Dept: PHYSICAL THERAPY | Age: 22
End: 2022-05-27
Payer: COMMERCIAL

## 2022-10-17 ENCOUNTER — OFFICE VISIT (OUTPATIENT)
Dept: ORTHOPEDIC SURGERY | Age: 22
End: 2022-10-17
Payer: COMMERCIAL

## 2022-10-17 DIAGNOSIS — M25.512 ACUTE PAIN OF LEFT SHOULDER: Primary | ICD-10-CM

## 2022-10-17 PROCEDURE — 99203 OFFICE O/P NEW LOW 30 MIN: CPT | Performed by: ORTHOPAEDIC SURGERY

## 2022-10-17 RX ORDER — DICLOFENAC SODIUM 75 MG/1
75 TABLET, DELAYED RELEASE ORAL 2 TIMES DAILY
Qty: 28 TABLET | Refills: 0 | Status: SHIPPED | OUTPATIENT
Start: 2022-10-17 | End: 2022-10-31

## 2022-10-17 NOTE — PROGRESS NOTES
Name: Julian Clements  YOB: 2000  Gender: male  MRN: [id number]      CC: Shoulder Pain (L shoulder)       HPI: Julian Clements is a 25 y.o. male who presents with Shoulder Pain (L shoulder)  Wen is a [place]  who presents today with left shoulder pain that began over the weekend. He was playing in a tennis tournament and fell on his stomach with his arm outstretched. He says he knew something was wrong with his shoulder but he was able to continue playing through the weekend. He went to the [facility] ER and says that he had negative x-rays. His chief complaint today is pain in his shoulder. He denies a past history of left shoulder pain but has a diagnosed torn labrum in his right shoulder. ROS/Meds/PSH/PMH/FH/SH: I personally reviewed the patients standard intake form. Below are the pertinents    Tobacco:  reports that he has never smoked. He has never used smokeless tobacco.  Diabetes: none  Other: none    Physical Examination:  General: no acute distress  Lungs: breathing easily  CV: regular rhythm by pulse  Left Shoulder: Active elevation up to the 150 passively to 170 with pain at the extremes. Active abduction up to 150. Tolerates rotation at his side he is 5-5 resisted rotator cuff strength. He has pain with speeds and [place]'s and Daniels's. No apprehension negative relocation maneuver. Negative sulcus sign. Imaging:   Shoulder XR: Grashey, Axillary and Scapula Yviews     Clinical Indication:  1. Acute pain of left shoulder           Report: Grashey, Axillary and Scapula Y XRs of the Left shoulder demonstrates no acute fracture dislocation or advanced degenerative change    Impression: No acute findings as above        All imaging interpreted by myself Jose D Knowles MD independent of radiology review        Assessment:     ICD-10-CM    1. Acute pain of left shoulder  M25.512 XR SHOULDER LEFT (MIN 2 VIEWS)          Plan:   Acute left shoulder injury.   Concern for potential labral pathology. He still in the acute phase after the injury. We talked about anti-inflammatories and treatment with the  if he is not improving later this week or early next week we would likely proceed with an MRI scan to rule out labral pathology. Nitish Levin MD, [address] [place] [address] and Sports Medicine

## 2022-10-27 DIAGNOSIS — M25.512 ACUTE PAIN OF LEFT SHOULDER: Primary | ICD-10-CM

## 2022-11-08 ENCOUNTER — TELEPHONE (OUTPATIENT)
Dept: ORTHOPEDIC SURGERY | Age: 22
End: 2022-11-08

## 2022-11-08 NOTE — TELEPHONE ENCOUNTER
The patient will need a peer to peer. He was on the schedule for 11/2 but it was cancelled. It has been denied through [facility] because medical necessity criteria was not met.      [facility] Peer to Peer  [phone]    Case# [id number]

## 2022-11-09 NOTE — TELEPHONE ENCOUNTER
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  If there is no improvement at that time a note will be documented and MRI will be re-submitted for approval.

## 2022-11-28 ENCOUNTER — CLINICAL DOCUMENTATION (OUTPATIENT)
Dept: ORTHOPEDIC SURGERY | Age: 22
End: 2022-11-28

## 2022-11-28 NOTE — PROGRESS NOTES
Wen continues to have activity limiting left shoulder pain with overhead activity despite conservative measures with anti-inflammatories and physical therapy and treatment with his . He continues to have pain. He has completed 6 weeks of conservative management with physical therapy and athletic training staff at [facility] but continues to have the shoulder pain concerning for labral pathology. We will plan to proceed with an MRI scan and I will see him back to review the results and make a definitive treatment plan.

## 2022-11-29 ENCOUNTER — TELEPHONE (OUTPATIENT)
Dept: ORTHOPEDIC SURGERY | Age: 22
End: 2022-11-29

## 2022-11-29 NOTE — TELEPHONE ENCOUNTER
Gege with [employer] is wanting to speak with someone about this patients MRI authorization for his shoulder. Please call the number provided.

## 2022-11-30 NOTE — TELEPHONE ENCOUNTER
Left another  for Mickeal Shone and said that the referral notes say that the MRI is approved. If she still has questions she can return my call.

## 2022-12-02 ENCOUNTER — HOSPITAL ENCOUNTER (OUTPATIENT)
Dept: MRI IMAGING | Age: 22
Discharge: HOME | End: 2022-12-05

## 2022-12-02 DIAGNOSIS — M25.512 ACUTE PAIN OF LEFT SHOULDER: ICD-10-CM

## 2024-05-09 ENCOUNTER — CLINICAL DOCUMENTATION (OUTPATIENT)
Dept: ORTHOPEDIC SURGERY | Age: 24
End: 2024-05-09

## (undated) DEVICE — C-ARM: Brand: UNBRANDED

## (undated) DEVICE — DRAPE,HAND,STERILE: Brand: MEDLINE

## (undated) DEVICE — SLING ARM AD ULT

## (undated) DEVICE — PADDING CAST COHESIVE 4 YDX3 IN HND TEARABLE COTTON SPEC 100

## (undated) DEVICE — STRIP,CLOSURE,WOUND,MEDI-STRIP,1/2X4: Brand: MEDLINE

## (undated) DEVICE — DISPOSABLE BIPOLAR CODE, 12' (3.66 M): Brand: CONMED

## (undated) DEVICE — GDWIRE 1.1X150MM TROCAR TIP --

## (undated) DEVICE — BANDAGE,ELASTIC,ESMARK,STERILE,4"X9',LF: Brand: MEDLINE

## (undated) DEVICE — TRNQT RMFG 18IN CUFF SING BLAD -- LAWSON OEM ITEM 338151

## (undated) DEVICE — SUT PROL 4-0 18IN P3 BLU --

## (undated) DEVICE — BNDG,ELSTC,MATRIX,STRL,4"X5YD,LF,HOOK&LP: Brand: MEDLINE

## (undated) DEVICE — DRAPE, FILM SHEET, 44X65 STERILE: Brand: MEDLINE

## (undated) DEVICE — PADDING CAST W4INXL4YD ST COT COHESIVE HND TEARABLE SPEC

## (undated) DEVICE — PREP SKN CHLRAPRP APL 26ML STR --

## (undated) DEVICE — SUTURE VCRL SZ 4-0 L27IN ABSRB UD L19MM PS-2 3/8 CIR PRIM J426H

## (undated) DEVICE — SUTURE VCRL SZ 3-0 L27IN ABSRB UD L26MM SH 1/2 CIR J416H

## (undated) DEVICE — GOWN,SIRUS,NONRNF,SETINSLV,XL,20/CS: Brand: MEDLINE

## (undated) DEVICE — SOLUTION IV 1000ML 0.9% SOD CHL

## (undated) DEVICE — SUTURE ABSORBABLE MONOFILAMENT 4-0 PS1 18 IN UD MONOCRYL + SXMP1B115

## (undated) DEVICE — BIT DRLCANN 2.0MM QC 145MM --

## (undated) DEVICE — HAND PACK: Brand: MEDLINE INDUSTRIES, INC.